# Patient Record
Sex: FEMALE | NOT HISPANIC OR LATINO | Employment: OTHER | URBAN - METROPOLITAN AREA
[De-identification: names, ages, dates, MRNs, and addresses within clinical notes are randomized per-mention and may not be internally consistent; named-entity substitution may affect disease eponyms.]

---

## 2023-04-28 LAB
ALANINE AMINOTRANSFERASE (SGPT) (U/L) IN SER/PLAS: 15 U/L (ref 7–45)
ALBUMIN (G/DL) IN SER/PLAS: 4.5 G/DL (ref 3.4–5)
ALKALINE PHOSPHATASE (U/L) IN SER/PLAS: 58 U/L (ref 33–136)
ANION GAP IN SER/PLAS: 15 MMOL/L (ref 10–20)
ASPARTATE AMINOTRANSFERASE (SGOT) (U/L) IN SER/PLAS: 20 U/L (ref 9–39)
BILIRUBIN TOTAL (MG/DL) IN SER/PLAS: 0.5 MG/DL (ref 0–1.2)
CALCIUM (MG/DL) IN SER/PLAS: 9.3 MG/DL (ref 8.6–10.3)
CARBON DIOXIDE, TOTAL (MMOL/L) IN SER/PLAS: 25 MMOL/L (ref 21–32)
CHLORIDE (MMOL/L) IN SER/PLAS: 103 MMOL/L (ref 98–107)
CHOLESTEROL (MG/DL) IN SER/PLAS: 153 MG/DL (ref 0–199)
CHOLESTEROL IN HDL (MG/DL) IN SER/PLAS: 48.2 MG/DL
CHOLESTEROL/HDL RATIO: 3.2
CREATININE (MG/DL) IN SER/PLAS: 0.76 MG/DL (ref 0.5–1.05)
ERYTHROCYTE DISTRIBUTION WIDTH (RATIO) BY AUTOMATED COUNT: 15.9 % (ref 11.5–14.5)
ERYTHROCYTE MEAN CORPUSCULAR HEMOGLOBIN CONCENTRATION (G/DL) BY AUTOMATED: 30.1 G/DL (ref 32–36)
ERYTHROCYTE MEAN CORPUSCULAR VOLUME (FL) BY AUTOMATED COUNT: 94 FL (ref 80–100)
ERYTHROCYTES (10*6/UL) IN BLOOD BY AUTOMATED COUNT: 5.33 X10E12/L (ref 4–5.2)
GFR FEMALE: 82 ML/MIN/1.73M2
GLUCOSE (MG/DL) IN SER/PLAS: 93 MG/DL (ref 74–99)
HEMATOCRIT (%) IN BLOOD BY AUTOMATED COUNT: 50.1 % (ref 36–46)
HEMOGLOBIN (G/DL) IN BLOOD: 15.1 G/DL (ref 12–16)
LDL: 86 MG/DL (ref 0–99)
LEUKOCYTES (10*3/UL) IN BLOOD BY AUTOMATED COUNT: 7.7 X10E9/L (ref 4.4–11.3)
PLATELETS (10*3/UL) IN BLOOD AUTOMATED COUNT: 253 X10E9/L (ref 150–450)
POTASSIUM (MMOL/L) IN SER/PLAS: 4.4 MMOL/L (ref 3.5–5.3)
PROTEIN TOTAL: 6.5 G/DL (ref 6.4–8.2)
SODIUM (MMOL/L) IN SER/PLAS: 139 MMOL/L (ref 136–145)
THYROTROPIN (MIU/L) IN SER/PLAS BY DETECTION LIMIT <= 0.05 MIU/L: 1.88 MIU/L (ref 0.44–3.98)
TRIGLYCERIDE (MG/DL) IN SER/PLAS: 96 MG/DL (ref 0–149)
UREA NITROGEN (MG/DL) IN SER/PLAS: 25 MG/DL (ref 6–23)
VLDL: 19 MG/DL (ref 0–40)

## 2023-04-29 LAB
ESTIMATED AVERAGE GLUCOSE FOR HBA1C: 151 MG/DL
HEMOGLOBIN A1C/HEMOGLOBIN TOTAL IN BLOOD: 6.9 %

## 2023-07-17 LAB
CREATININE (MG/DL) IN SER/PLAS: 0.76 MG/DL (ref 0.5–1.05)
GFR FEMALE: 82 ML/MIN/1.73M2
UREA NITROGEN (MG/DL) IN SER/PLAS: 24 MG/DL (ref 6–23)

## 2023-10-09 ENCOUNTER — TELEPHONE (OUTPATIENT)
Dept: PRIMARY CARE | Facility: CLINIC | Age: 73
End: 2023-10-09
Payer: MEDICARE

## 2023-10-09 DIAGNOSIS — I15.9 SECONDARY HYPERTENSION: Primary | ICD-10-CM

## 2023-10-09 RX ORDER — ATENOLOL 25 MG/1
25 TABLET ORAL DAILY
Qty: 90 TABLET | Refills: 1 | Status: CANCELLED | OUTPATIENT
Start: 2023-10-09 | End: 2024-04-06

## 2023-10-10 ENCOUNTER — TELEPHONE (OUTPATIENT)
Dept: PRIMARY CARE | Facility: CLINIC | Age: 73
End: 2023-10-10
Payer: MEDICARE

## 2023-10-10 DIAGNOSIS — I10 HYPERTENSION, UNSPECIFIED TYPE: Primary | ICD-10-CM

## 2023-10-10 RX ORDER — ATENOLOL 25 MG/1
25 TABLET ORAL DAILY
Qty: 90 TABLET | Refills: 1 | Status: SHIPPED | OUTPATIENT
Start: 2023-10-10 | End: 2024-04-04

## 2023-10-12 ENCOUNTER — TELEPHONE (OUTPATIENT)
Dept: PRIMARY CARE | Facility: CLINIC | Age: 73
End: 2023-10-12
Payer: MEDICARE

## 2023-10-12 DIAGNOSIS — E78.5 HYPERLIPIDEMIA, UNSPECIFIED HYPERLIPIDEMIA TYPE: Primary | ICD-10-CM

## 2023-10-12 RX ORDER — PRAVASTATIN SODIUM 20 MG/1
20 TABLET ORAL DAILY
Qty: 90 TABLET | Refills: 3 | Status: SHIPPED | OUTPATIENT
Start: 2023-10-12 | End: 2024-10-11

## 2023-10-13 RX ORDER — ATENOLOL 25 MG/1
25 TABLET ORAL DAILY
Qty: 90 TABLET | Refills: 3 | Status: SHIPPED | OUTPATIENT
Start: 2023-10-13 | End: 2023-10-23 | Stop reason: ALTCHOICE

## 2023-10-23 ENCOUNTER — OFFICE VISIT (OUTPATIENT)
Dept: PRIMARY CARE | Facility: CLINIC | Age: 73
End: 2023-10-23
Payer: MEDICARE

## 2023-10-23 VITALS
DIASTOLIC BLOOD PRESSURE: 76 MMHG | TEMPERATURE: 97.8 F | OXYGEN SATURATION: 96 % | SYSTOLIC BLOOD PRESSURE: 128 MMHG | BODY MASS INDEX: 28.53 KG/M2 | HEART RATE: 68 BPM | WEIGHT: 156 LBS

## 2023-10-23 DIAGNOSIS — M79.672 LEFT FOOT PAIN: Primary | ICD-10-CM

## 2023-10-23 DIAGNOSIS — I83.812 VARICOSE VEINS OF LEFT LOWER EXTREMITY WITH PAIN: ICD-10-CM

## 2023-10-23 PROBLEM — M85.80 OSTEOPENIA: Status: ACTIVE | Noted: 2023-10-23

## 2023-10-23 PROBLEM — M47.27 LUMBOSACRAL RADICULOPATHY DUE TO DEGENERATIVE JOINT DISEASE OF SPINE: Status: ACTIVE | Noted: 2023-10-23

## 2023-10-23 PROBLEM — N60.19 FIBROCYSTIC BREAST DISEASE: Status: ACTIVE | Noted: 2023-10-23

## 2023-10-23 PROBLEM — E04.1 THYROID NODULE: Status: ACTIVE | Noted: 2023-10-23

## 2023-10-23 PROBLEM — M54.30 SCIATICA: Status: ACTIVE | Noted: 2023-10-23

## 2023-10-23 PROBLEM — M54.16 CHRONIC RADICULAR LUMBAR PAIN: Status: ACTIVE | Noted: 2023-10-23

## 2023-10-23 PROBLEM — M79.18 MYOFASCIAL PAIN: Status: ACTIVE | Noted: 2023-10-23

## 2023-10-23 PROBLEM — I73.9 PERIPHERAL VASCULAR DISEASE (CMS-HCC): Status: ACTIVE | Noted: 2023-10-23

## 2023-10-23 PROBLEM — M54.16 LUMBAR RADICULITIS: Status: ACTIVE | Noted: 2023-10-23

## 2023-10-23 PROBLEM — G89.29 CHRONIC RADICULAR LUMBAR PAIN: Status: ACTIVE | Noted: 2023-10-23

## 2023-10-23 PROBLEM — M96.1 LUMBAR POST-LAMINECTOMY SYNDROME: Status: ACTIVE | Noted: 2023-10-23

## 2023-10-23 PROCEDURE — 99214 OFFICE O/P EST MOD 30 MIN: CPT | Performed by: STUDENT IN AN ORGANIZED HEALTH CARE EDUCATION/TRAINING PROGRAM

## 2023-10-23 PROCEDURE — 1159F MED LIST DOCD IN RCRD: CPT | Performed by: STUDENT IN AN ORGANIZED HEALTH CARE EDUCATION/TRAINING PROGRAM

## 2023-10-23 RX ORDER — PIOGLITAZONEHYDROCHLORIDE 30 MG/1
30 TABLET ORAL DAILY
COMMUNITY
End: 2024-02-06 | Stop reason: SDUPTHER

## 2023-10-23 RX ORDER — FENOFIBRATE 160 MG/1
160 TABLET ORAL DAILY
COMMUNITY
End: 2023-11-14

## 2023-10-23 RX ORDER — LISINOPRIL 20 MG/1
20 TABLET ORAL DAILY
COMMUNITY
End: 2023-12-19

## 2023-10-23 NOTE — PROGRESS NOTES
Subjective   Patient ID: Yuliet Graandos is a 73 y.o. female who presents for Foot Injury (LEFT FOOT).  Today she is accompanied by alone.     HPI  L foot pain   That started 3 weeks ago.  Pain is on the dorsal aspect of the left foot.  She also complains of left calf pain.  Patient states the pain started when she was getting out of a car.  Endorses no history of trauma.  She endorses a spinal block 1 month ago for bulging L4 disc.  On physical exam there is no redness, swelling, or bruising.  Calves are symmetrical.  No signs of DVT.  Patient states the pain is 1 out of 10 today.  Recent lab work including CMP were within normal limits.     Current Outpatient Medications on File Prior to Visit   Medication Sig Dispense Refill    atenolol (Tenormin) 25 mg tablet Take 1 tablet (25 mg) by mouth once daily. 90 tablet 1    pravastatin (Pravachol) 20 mg tablet Take 1 tablet (20 mg) by mouth once daily. 90 tablet 3    fenofibrate (Triglide) 160 mg tablet Take 1 tablet (160 mg) by mouth once daily.      lisinopril 20 mg tablet Take 1 tablet (20 mg) by mouth once daily.      pioglitazone (Actos) 30 mg tablet Take 1 tablet (30 mg) by mouth once daily.      [DISCONTINUED] atenolol (Tenormin) 25 mg tablet Take 1 tablet (25 mg) by mouth once daily. (Patient not taking: Reported on 10/23/2023) 90 tablet 3     No current facility-administered medications on file prior to visit.        Allergies   Allergen Reactions    Amoxicillin Nausea And Vomiting    Amoxicillin-Pot Clavulanate Nausea And Vomiting    Doxycycline Nausea And Vomiting    Meperidine Hallucinations         There is no immunization history on file for this patient.      Review of Systems  All pertinent positive symptoms are included in the history of present illness.  All other systems have been reviewed and are negative and noncontributory to this patient's current ailments.     Objective   /76   Pulse 68   Temp 36.6 °C (97.8 °F)   Wt 70.8 kg (156 lb)    SpO2 96%   BMI 28.53 kg/m²   BSA: 1.76 meters squared  No visits with results within 1 Month(s) from this visit.   Latest known visit with results is:   Orders Only on 07/17/2023   Component Date Value Ref Range Status    Urea Nitrogen 07/17/2023 24 (H)  6 - 23 mg/dL Final    Creatinine 07/17/2023 0.76  0.50 - 1.05 mg/dL Final    GFR Female 07/17/2023 82  >90 mL/min/1.73m2 Final    Comment:  CALCULATIONS OF ESTIMATED GFR ARE PERFORMED   USING THE 2021 CKD-EPI STUDY REFIT EQUATION   WITHOUT THE RACE VARIABLE FOR THE IDMS-TRACEABLE   CREATININE METHODS.    https://jasn.asnjournals.org/content/early/2021/09/22/ASN.7154056957         Physical Exam  CONSTITUTIONAL - well nourished, well developed, looks like stated age, in no acute distress, not ill-appearing, and not tired appearing  SKIN - normal skin color and pigmentation, normal skin turgor without rash, lesions, or nodules visualized  HEAD - no trauma, normocephalic  EYES - normal external exam  CHEST -no distressed breathing, good effort  EXTREMITIES - no edema, no deformities  NEUROLOGICAL - normal balance, normal motor, no ataxia  PSYCHIATRIC - alert, pleasant and cordial, age-appropriate    Assessment/Plan       L foot pain  Patient denied need for x-ray at this time.  A handout on exercises for Planter fasciitis was given.  Requisition for physical therapy was also placed.  Please follow-up with them at your convenience.  We also discussed the use of heat, ice, and stretching to avoid future exacerbations.

## 2023-11-14 DIAGNOSIS — E78.5 HYPERLIPIDEMIA, UNSPECIFIED HYPERLIPIDEMIA TYPE: Primary | ICD-10-CM

## 2023-11-14 RX ORDER — FENOFIBRATE 160 MG/1
160 TABLET ORAL DAILY
Qty: 90 TABLET | Refills: 0 | Status: SHIPPED | OUTPATIENT
Start: 2023-11-14 | End: 2024-02-13

## 2023-11-15 ENCOUNTER — TELEPHONE (OUTPATIENT)
Dept: PRIMARY CARE | Facility: CLINIC | Age: 73
End: 2023-11-15
Payer: MEDICARE

## 2023-11-15 DIAGNOSIS — R73.03 PRE-DIABETES: Primary | ICD-10-CM

## 2023-11-15 NOTE — TELEPHONE ENCOUNTER
Needs a lab order. To include A1C for her diabetes.  Going to     A1C was sent but patient needs all of her labs done.

## 2023-11-15 NOTE — PROGRESS NOTES
A1c added to lab order.    [facility]    Please give pt a call to schedule. Thank you    Next Appointment:  2 Months     Tests:      Please have labs and x-rays that were ordered performed.     Recommend having labs (and x-rays if ordered) performed at a [facility] facility, as results are then automatically uploaded into our in-basket system.  If having labs performed at a [place] facility then patient should contact/notify us soon thereafter, so we can actively retrieve results from [employer] system.

## 2023-11-17 ENCOUNTER — LAB (OUTPATIENT)
Dept: LAB | Facility: LAB | Age: 73
End: 2023-11-17
Payer: MEDICARE

## 2023-11-17 DIAGNOSIS — R73.03 PRE-DIABETES: ICD-10-CM

## 2023-11-17 DIAGNOSIS — I83.812 VARICOSE VEINS OF LEFT LOWER EXTREMITY WITH PAIN: ICD-10-CM

## 2023-11-17 LAB
ALBUMIN SERPL BCP-MCNC: 4.2 G/DL (ref 3.4–5)
ALP SERPL-CCNC: 47 U/L (ref 33–136)
ALT SERPL W P-5'-P-CCNC: 12 U/L (ref 7–45)
ANION GAP SERPL CALC-SCNC: 16 MMOL/L (ref 10–20)
AST SERPL W P-5'-P-CCNC: 17 U/L (ref 9–39)
BILIRUB SERPL-MCNC: 0.4 MG/DL (ref 0–1.2)
BUN SERPL-MCNC: 22 MG/DL (ref 6–23)
CALCIUM SERPL-MCNC: 9.5 MG/DL (ref 8.6–10.3)
CHLORIDE SERPL-SCNC: 104 MMOL/L (ref 98–107)
CO2 SERPL-SCNC: 24 MMOL/L (ref 21–32)
CREAT SERPL-MCNC: 0.74 MG/DL (ref 0.5–1.05)
EST. AVERAGE GLUCOSE BLD GHB EST-MCNC: 140 MG/DL
GFR SERPL CREATININE-BSD FRML MDRD: 84 ML/MIN/1.73M*2
GLUCOSE SERPL-MCNC: 110 MG/DL (ref 74–99)
HBA1C MFR BLD: 6.5 %
MAGNESIUM SERPL-MCNC: 2.04 MG/DL (ref 1.6–2.4)
POTASSIUM SERPL-SCNC: 4.9 MMOL/L (ref 3.5–5.3)
PROT SERPL-MCNC: 6.4 G/DL (ref 6.4–8.2)
SODIUM SERPL-SCNC: 139 MMOL/L (ref 136–145)

## 2023-11-17 PROCEDURE — 83036 HEMOGLOBIN GLYCOSYLATED A1C: CPT

## 2023-11-17 PROCEDURE — 36415 COLL VENOUS BLD VENIPUNCTURE: CPT

## 2023-11-17 PROCEDURE — 83735 ASSAY OF MAGNESIUM: CPT

## 2023-11-17 PROCEDURE — 80053 COMPREHEN METABOLIC PANEL: CPT

## 2023-12-19 DIAGNOSIS — I10 HYPERTENSION, UNSPECIFIED TYPE: Primary | ICD-10-CM

## 2023-12-19 RX ORDER — LISINOPRIL 20 MG/1
20 TABLET ORAL DAILY
Qty: 90 TABLET | Refills: 2 | Status: SHIPPED | OUTPATIENT
Start: 2023-12-19

## 2024-02-06 DIAGNOSIS — E11.9 TYPE 2 DIABETES MELLITUS WITHOUT COMPLICATION, WITHOUT LONG-TERM CURRENT USE OF INSULIN (MULTI): Primary | ICD-10-CM

## 2024-02-06 DIAGNOSIS — Z12.31 ENCOUNTER FOR SCREENING MAMMOGRAM FOR BREAST CANCER: Primary | ICD-10-CM

## 2024-02-06 RX ORDER — PIOGLITAZONEHYDROCHLORIDE 30 MG/1
30 TABLET ORAL DAILY
Qty: 90 TABLET | Refills: 1 | Status: SHIPPED | OUTPATIENT
Start: 2024-02-06 | End: 2024-08-04

## 2024-02-06 NOTE — TELEPHONE ENCOUNTER
Patient is requesting refill   To [facility]  Pioglitazone 30 MG  #90  Please make sure this medication goes to

## 2024-02-12 ENCOUNTER — HOSPITAL ENCOUNTER (OUTPATIENT)
Dept: RADIOLOGY | Facility: HOSPITAL | Age: 74
Discharge: HOME | End: 2024-02-12
Payer: MEDICARE

## 2024-02-12 DIAGNOSIS — Z12.31 ENCOUNTER FOR SCREENING MAMMOGRAM FOR BREAST CANCER: ICD-10-CM

## 2024-02-12 PROCEDURE — 77063 BREAST TOMOSYNTHESIS BI: CPT | Performed by: RADIOLOGY

## 2024-02-12 PROCEDURE — 77067 SCR MAMMO BI INCL CAD: CPT | Performed by: RADIOLOGY

## 2024-02-12 PROCEDURE — 77067 SCR MAMMO BI INCL CAD: CPT

## 2024-02-13 DIAGNOSIS — E78.5 HYPERLIPIDEMIA, UNSPECIFIED HYPERLIPIDEMIA TYPE: ICD-10-CM

## 2024-02-13 RX ORDER — FENOFIBRATE 160 MG/1
160 TABLET ORAL DAILY
Qty: 30 TABLET | Refills: 0 | Status: SHIPPED | OUTPATIENT
Start: 2024-02-13 | End: 2024-02-15 | Stop reason: SDUPTHER

## 2024-02-15 DIAGNOSIS — E78.5 HYPERLIPIDEMIA, UNSPECIFIED HYPERLIPIDEMIA TYPE: ICD-10-CM

## 2024-02-15 RX ORDER — FENOFIBRATE 160 MG/1
160 TABLET ORAL DAILY
Qty: 90 TABLET | Refills: 2 | Status: SHIPPED | OUTPATIENT
Start: 2024-02-15

## 2024-02-15 NOTE — TELEPHONE ENCOUNTER
Patient Fenofibrate 160mg  takes one a day.  Script was sent in for #30  with no refill.  Pharmacy [facility]  Can she get #90 with refills.

## 2024-02-22 ENCOUNTER — APPOINTMENT (OUTPATIENT)
Dept: PRIMARY CARE | Facility: CLINIC | Age: 74
End: 2024-02-22
Payer: MEDICARE

## 2024-02-22 ENCOUNTER — OFFICE VISIT (OUTPATIENT)
Dept: PRIMARY CARE | Facility: CLINIC | Age: 74
End: 2024-02-22
Payer: MEDICARE

## 2024-02-22 VITALS
WEIGHT: 157 LBS | TEMPERATURE: 97.4 F | HEART RATE: 82 BPM | HEIGHT: 62 IN | BODY MASS INDEX: 28.89 KG/M2 | OXYGEN SATURATION: 96 %

## 2024-02-22 DIAGNOSIS — M25.812 IMPINGEMENT OF LEFT SHOULDER: Primary | ICD-10-CM

## 2024-02-22 PROCEDURE — 99214 OFFICE O/P EST MOD 30 MIN: CPT | Performed by: FAMILY MEDICINE

## 2024-02-22 PROCEDURE — 1125F AMNT PAIN NOTED PAIN PRSNT: CPT | Performed by: FAMILY MEDICINE

## 2024-02-22 PROCEDURE — 20610 DRAIN/INJ JOINT/BURSA W/O US: CPT

## 2024-02-22 PROCEDURE — 1159F MED LIST DOCD IN RCRD: CPT | Performed by: FAMILY MEDICINE

## 2024-02-22 RX ORDER — LIDOCAINE HYDROCHLORIDE 10 MG/ML
2 INJECTION INFILTRATION; PERINEURAL
Status: COMPLETED | OUTPATIENT
Start: 2024-02-22 | End: 2024-02-22

## 2024-02-22 RX ORDER — TRIAMCINOLONE ACETONIDE 40 MG/ML
40 INJECTION, SUSPENSION INTRA-ARTICULAR; INTRAMUSCULAR ONCE
Status: DISCONTINUED | OUTPATIENT
Start: 2024-02-22 | End: 2024-02-22

## 2024-02-22 RX ORDER — TRIAMCINOLONE ACETONIDE 40 MG/ML
40 INJECTION, SUSPENSION INTRA-ARTICULAR; INTRAMUSCULAR
Status: COMPLETED | OUTPATIENT
Start: 2024-02-22 | End: 2024-02-22

## 2024-02-22 RX ADMIN — LIDOCAINE HYDROCHLORIDE 2 ML: 10 INJECTION INFILTRATION; PERINEURAL at 13:48

## 2024-02-22 RX ADMIN — TRIAMCINOLONE ACETONIDE 40 MG: 40 INJECTION, SUSPENSION INTRA-ARTICULAR; INTRAMUSCULAR at 13:48

## 2024-02-22 ASSESSMENT — PAIN SCALES - GENERAL: PAINLEVEL: 4

## 2024-02-22 NOTE — PROGRESS NOTES
"Subjective   Patient ID: Yluiet Granados is a 73 y.o. female who presents for left shoulder pain (Left shoulder pain goes down into bicep would like an injection).    -Left shoulder pain for years   -Last cortisone injection lasted for 18mo-2 years, has been hurting for a few weeks now  -Estimates that she's had about 4 cortisone injections for the left shoulder  -Right hand dominant  -Difficulty with Abduction    Denies new onset headaches, fever, chills, n/v/d, chest pain, SOB, abdominal pain, urinary symptoms, and lower extremity edema.     Review of Systems  All other systems have been reviewed and are negative.    Visit Vitals  Pulse 82   Temp 36.3 °C (97.4 °F)   Ht 1.575 m (5' 2\")   Wt 71.2 kg (157 lb)   SpO2 96%   BMI 28.72 kg/m²   OB Status Postmenopausal   Smoking Status Every Day   BSA 1.76 m²       Objective   Physical Exam  General: Alert and oriented. Appears well-nourished and in no acute distress.  Head/neck: Normocephalic. Atraumatic.  Respiratory: Normal effort    Musculoskeletal:   Left shoulder-Pain with abduction, internal rotation to T10, full ROM forward flexion. Positive Shakir's, positive hawkin's. Tenderness to palpation along superior aspect of left shoulder.     Psychological: Appropriate mood and affect.   Skin: No visible rashes or lesions.     Assessment/Plan   Yuliet is a 73 year old female who presents with left shoulder pain.     Problem List Items Addressed This Visit       Impingement of left shoulder    -  Primary  -Patient likely has some component of arthritis and shoulder impingement due to physical exam  -Patient has had corticosteroid injection in L shoulder and is requesting one today  -Kenalog injection given       Joint Injection Large/Arthrocentesis: L subacromial bursa on 2/22/2024 1:48 PM  Indications: pain  Details: 22 G needle, posterior approach  Medications: 40 mg triamcinolone acetonide 40 mg/mL; 2 mL lidocaine 10 mg/mL (1 %)  Outcome: tolerated well, no immediate " complications  Procedure, treatment alternatives, risks and benefits explained, specific risks discussed. Consent was given by the patient. Immediately prior to procedure a time out was called to verify the correct patient, procedure, equipment, support staff and site/side marked as required. Patient was prepped and draped in the usual sterile fashion.       No red flags. All questions and concerns were addressed. Patient verbalizes understanding instructions and agrees with established plan of care.     Patient was seen  and discussed with attending physician Dr. Lopez.  Christy Lino,   Family Medicine, PGY-2

## 2024-02-22 NOTE — PROGRESS NOTES
I reviewed and examined the patient. I was present for the key exam elements, and I fully participated in the patient's care. I discussed the management of the care with the resident. I have personally reviewed the pertinent labs and imaging, as well as recent notes, with the patient. I have reviewed the note above and agree with the resident's medical decision making as documented in the resident's note, in addition to the following comments / findings:     Agree with the rest of the plan outlined below by resident physician. No red flags.      The patient understands and agrees to the assessment and plan of care. Patient has also agreed to follow up and comply with the treatment and evaluation as recommended today. Patient was instructed to call the office at [phone] should questions arise regarding their treatment or care.     Ko Lopez DO, FAOASM  Family Medicine   [address], [address]  [place] [address]     Ko Lopez DO

## 2024-02-29 ENCOUNTER — OFFICE VISIT (OUTPATIENT)
Dept: SURGERY | Facility: CLINIC | Age: 74
End: 2024-02-29
Payer: MEDICARE

## 2024-02-29 VITALS
BODY MASS INDEX: 28.34 KG/M2 | HEART RATE: 53 BPM | HEIGHT: 62 IN | TEMPERATURE: 96.8 F | SYSTOLIC BLOOD PRESSURE: 126 MMHG | DIASTOLIC BLOOD PRESSURE: 61 MMHG | OXYGEN SATURATION: 99 % | WEIGHT: 154 LBS

## 2024-02-29 DIAGNOSIS — Z12.39 ENCOUNTER FOR SCREENING BREAST EXAMINATION: Primary | ICD-10-CM

## 2024-02-29 PROCEDURE — 1126F AMNT PAIN NOTED NONE PRSNT: CPT | Performed by: SURGERY

## 2024-02-29 PROCEDURE — 99213 OFFICE O/P EST LOW 20 MIN: CPT | Performed by: SURGERY

## 2024-02-29 PROCEDURE — 1159F MED LIST DOCD IN RCRD: CPT | Performed by: SURGERY

## 2024-02-29 ASSESSMENT — PATIENT HEALTH QUESTIONNAIRE - PHQ9
2. FEELING DOWN, DEPRESSED OR HOPELESS: NOT AT ALL
1. LITTLE INTEREST OR PLEASURE IN DOING THINGS: NOT AT ALL
SUM OF ALL RESPONSES TO PHQ9 QUESTIONS 1 AND 2: 0

## 2024-02-29 ASSESSMENT — PAIN SCALES - GENERAL: PAINLEVEL: 0-NO PAIN

## 2024-02-29 ASSESSMENT — ENCOUNTER SYMPTOMS
OCCASIONAL FEELINGS OF UNSTEADINESS: 0
LOSS OF SENSATION IN FEET: 0
DEPRESSION: 0

## 2024-03-01 NOTE — PROGRESS NOTES
Subjective   Patient ID: Yuliet Granados is a 73 y.o. female who presents for Follow-up (EST Annual Breast Exam).  HPI  This is a very pleasant woman that we see on a yearly basis for her breast exam.  The patient has fully retired that is fairly recent news for her.  She feels well and has no other pertinent complaints.  She does have some arthritis issues but that is her only health concern.  Her recent mammogram was unchanged.  Review of Systems  10 point review is otherwise negative  Objective   Physical Exam head is normocephalic atraumatic eyes extraocular movements are intact pupils are equal and round the breasts are symmetric there are no skin changes no nipple discharge no retraction.  There are no palpable masses in either breast.  Supraclavicular fossae and the axillae are both negative.  Extremities do not reveal any gross deformities.  The mammogram was reviewed and it is unchanged.    Assessment/Plan continue self breast exam follow-up mammogram in 1 year.  Clinical breast exam after.           Aletha Estrada MD 02/29/24 8:11 PM

## 2024-04-04 DIAGNOSIS — I10 HYPERTENSION, UNSPECIFIED TYPE: ICD-10-CM

## 2024-04-04 RX ORDER — ATENOLOL 25 MG/1
25 TABLET ORAL DAILY
Qty: 90 TABLET | Refills: 3 | Status: SHIPPED | OUTPATIENT
Start: 2024-04-04

## 2024-04-09 ENCOUNTER — TELEPHONE (OUTPATIENT)
Dept: PRIMARY CARE | Facility: CLINIC | Age: 74
End: 2024-04-09
Payer: MEDICARE

## 2024-04-09 DIAGNOSIS — E78.5 HYPERLIPIDEMIA, UNSPECIFIED HYPERLIPIDEMIA TYPE: ICD-10-CM

## 2024-04-09 DIAGNOSIS — R53.83 FATIGUE, UNSPECIFIED TYPE: ICD-10-CM

## 2024-04-09 DIAGNOSIS — R73.03 PRE-DIABETES: Primary | ICD-10-CM

## 2024-04-09 NOTE — TELEPHONE ENCOUNTER
Can she get lab order for 6 month check that would include A1C. Patient scheduled for may for bruising just in case different labs are needed.

## 2024-04-11 ENCOUNTER — LAB (OUTPATIENT)
Dept: LAB | Facility: LAB | Age: 74
End: 2024-04-11
Payer: MEDICARE

## 2024-04-11 DIAGNOSIS — E78.5 HYPERLIPIDEMIA, UNSPECIFIED HYPERLIPIDEMIA TYPE: ICD-10-CM

## 2024-04-11 DIAGNOSIS — R53.83 FATIGUE, UNSPECIFIED TYPE: ICD-10-CM

## 2024-04-11 DIAGNOSIS — R73.03 PRE-DIABETES: ICD-10-CM

## 2024-04-11 LAB
ALBUMIN SERPL BCP-MCNC: 4.6 G/DL (ref 3.4–5)
ALP SERPL-CCNC: 49 U/L (ref 33–136)
ALT SERPL W P-5'-P-CCNC: 18 U/L (ref 7–45)
ANION GAP SERPL CALC-SCNC: 18 MMOL/L (ref 10–20)
AST SERPL W P-5'-P-CCNC: 18 U/L (ref 9–39)
BASOPHILS # BLD AUTO: 0.1 X10*3/UL (ref 0–0.1)
BASOPHILS NFR BLD AUTO: 1.1 %
BILIRUB SERPL-MCNC: 0.5 MG/DL (ref 0–1.2)
BUN SERPL-MCNC: 28 MG/DL (ref 6–23)
CALCIUM SERPL-MCNC: 10.1 MG/DL (ref 8.6–10.3)
CHLORIDE SERPL-SCNC: 100 MMOL/L (ref 98–107)
CHOLEST SERPL-MCNC: 201 MG/DL (ref 0–199)
CHOLESTEROL/HDL RATIO: 3.2
CO2 SERPL-SCNC: 27 MMOL/L (ref 21–32)
CREAT SERPL-MCNC: 0.81 MG/DL (ref 0.5–1.05)
EGFRCR SERPLBLD CKD-EPI 2021: 76 ML/MIN/1.73M*2
EOSINOPHIL # BLD AUTO: 0.15 X10*3/UL (ref 0–0.4)
EOSINOPHIL NFR BLD AUTO: 1.7 %
ERYTHROCYTE [DISTWIDTH] IN BLOOD BY AUTOMATED COUNT: 16.4 % (ref 11.5–14.5)
EST. AVERAGE GLUCOSE BLD GHB EST-MCNC: 148 MG/DL
GLUCOSE SERPL-MCNC: 102 MG/DL (ref 74–99)
HBA1C MFR BLD: 6.8 %
HCT VFR BLD AUTO: 51.2 % (ref 36–46)
HDLC SERPL-MCNC: 63.4 MG/DL
HGB BLD-MCNC: 16.4 G/DL (ref 12–16)
IMM GRANULOCYTES # BLD AUTO: 0.05 X10*3/UL (ref 0–0.5)
IMM GRANULOCYTES NFR BLD AUTO: 0.6 % (ref 0–0.9)
LDLC SERPL CALC-MCNC: 116 MG/DL
LYMPHOCYTES # BLD AUTO: 2.3 X10*3/UL (ref 0.8–3)
LYMPHOCYTES NFR BLD AUTO: 26 %
MCH RBC QN AUTO: 29.1 PG (ref 26–34)
MCHC RBC AUTO-ENTMCNC: 32 G/DL (ref 32–36)
MCV RBC AUTO: 91 FL (ref 80–100)
MONOCYTES # BLD AUTO: 0.73 X10*3/UL (ref 0.05–0.8)
MONOCYTES NFR BLD AUTO: 8.2 %
NEUTROPHILS # BLD AUTO: 5.53 X10*3/UL (ref 1.6–5.5)
NEUTROPHILS NFR BLD AUTO: 62.4 %
NON HDL CHOLESTEROL: 138 MG/DL (ref 0–149)
NRBC BLD-RTO: 0 /100 WBCS (ref 0–0)
PLATELET # BLD AUTO: 330 X10*3/UL (ref 150–450)
POTASSIUM SERPL-SCNC: 4.7 MMOL/L (ref 3.5–5.3)
PROT SERPL-MCNC: 7.1 G/DL (ref 6.4–8.2)
RBC # BLD AUTO: 5.64 X10*6/UL (ref 4–5.2)
SODIUM SERPL-SCNC: 140 MMOL/L (ref 136–145)
TRIGL SERPL-MCNC: 110 MG/DL (ref 0–149)
VLDL: 22 MG/DL (ref 0–40)
WBC # BLD AUTO: 8.9 X10*3/UL (ref 4.4–11.3)

## 2024-04-11 PROCEDURE — 83036 HEMOGLOBIN GLYCOSYLATED A1C: CPT

## 2024-04-11 PROCEDURE — 36415 COLL VENOUS BLD VENIPUNCTURE: CPT

## 2024-04-11 PROCEDURE — 80053 COMPREHEN METABOLIC PANEL: CPT

## 2024-04-11 PROCEDURE — 80061 LIPID PANEL: CPT

## 2024-04-11 PROCEDURE — 85025 COMPLETE CBC W/AUTO DIFF WBC: CPT

## 2024-05-09 ENCOUNTER — OFFICE VISIT (OUTPATIENT)
Dept: PRIMARY CARE | Facility: CLINIC | Age: 74
End: 2024-05-09
Payer: MEDICARE

## 2024-05-09 VITALS
HEART RATE: 59 BPM | SYSTOLIC BLOOD PRESSURE: 157 MMHG | BODY MASS INDEX: 29.26 KG/M2 | WEIGHT: 159 LBS | OXYGEN SATURATION: 97 % | HEIGHT: 62 IN | DIASTOLIC BLOOD PRESSURE: 67 MMHG | TEMPERATURE: 97.4 F

## 2024-05-09 DIAGNOSIS — R71.8 ELEVATED HEMATOCRIT: ICD-10-CM

## 2024-05-09 DIAGNOSIS — F17.200 TOBACCO USE DISORDER: ICD-10-CM

## 2024-05-09 DIAGNOSIS — D58.2 ELEVATED HEMOGLOBIN (CMS-HCC): ICD-10-CM

## 2024-05-09 DIAGNOSIS — L53.9 ERYTHEMA OF HAND: Primary | ICD-10-CM

## 2024-05-09 PROCEDURE — 1159F MED LIST DOCD IN RCRD: CPT | Performed by: FAMILY MEDICINE

## 2024-05-09 PROCEDURE — 1158F ADVNC CARE PLAN TLK DOCD: CPT | Performed by: FAMILY MEDICINE

## 2024-05-09 PROCEDURE — 1126F AMNT PAIN NOTED NONE PRSNT: CPT | Performed by: FAMILY MEDICINE

## 2024-05-09 PROCEDURE — 1123F ACP DISCUSS/DSCN MKR DOCD: CPT | Performed by: FAMILY MEDICINE

## 2024-05-09 PROCEDURE — 99213 OFFICE O/P EST LOW 20 MIN: CPT | Performed by: FAMILY MEDICINE

## 2024-05-09 ASSESSMENT — PATIENT HEALTH QUESTIONNAIRE - PHQ9
1. LITTLE INTEREST OR PLEASURE IN DOING THINGS: NOT AT ALL
2. FEELING DOWN, DEPRESSED OR HOPELESS: NOT AT ALL
SUM OF ALL RESPONSES TO PHQ9 QUESTIONS 1 AND 2: 0

## 2024-05-09 ASSESSMENT — PAIN SCALES - GENERAL: PAINLEVEL: 0-NO PAIN

## 2024-05-09 NOTE — PROGRESS NOTES
"Chief Complaint Yuliet Granados is a 74 y.o. female who presents for \"bruising\" of the hands    HPI:  Pt states that her hands and toes often get red. Denies any thing making it worse such as a shower or hot tub. Does complain of some itching in that area. Denies any rashes. Denies the cold making redness worse. Denies any history of blood clots. States that her mother had something similar her whole life. Endorses that she does smoke a quarter pack per day.  Has previously seen a hematologist oncologist in the past for elevated white blood cells and hemoglobin but they said everything was fine and suggested to quit smoking. Denies any other complaints or concerns. ROS reviewed below.       ROS:  Review of Systems   Constitutional:  Negative for chills, fatigue and fever.   HENT:  Negative for nosebleeds.    Eyes:  Negative for visual disturbance.   Gastrointestinal:  Negative for blood in stool.   Genitourinary:  Negative for hematuria and vaginal bleeding.   Musculoskeletal:  Negative for arthralgias, joint swelling and myalgias.   Skin:  Positive for color change.        Erythema of bilateral hands   Neurological:  Negative for dizziness, syncope, light-headedness and headaches.   Hematological:  Does not bruise/bleed easily.       Meds:    Current Outpatient Medications:     atenolol (Tenormin) 25 mg tablet, TAKE ONE TABLET BY MOUTH ONCE A  DAY, Disp: 90 tablet, Rfl: 3    fenofibrate (Triglide) 160 mg tablet, Take 1 tablet (160 mg) by mouth once daily., Disp: 90 tablet, Rfl: 2    lisinopril 20 mg tablet, TAKE ONE TABLET BY MOUTH EVERY DAY, Disp: 90 tablet, Rfl: 2    pioglitazone (Actos) 30 mg tablet, Take 1 tablet (30 mg) by mouth once daily., Disp: 90 tablet, Rfl: 1    pravastatin (Pravachol) 20 mg tablet, Take 1 tablet (20 mg) by mouth once daily., Disp: 90 tablet, Rfl: 3    Allergies:  Allergies   Allergen Reactions    Amoxicillin Nausea And Vomiting    Amoxicillin-Pot Clavulanate Nausea And Vomiting    " "Doxycycline Nausea And Vomiting    Meperidine Hallucinations       PE:  Visit Vitals  /67   Pulse 59   Temp 36.3 °C (97.4 °F)   Ht 1.575 m (5' 2\")   Wt 72.1 kg (159 lb)   SpO2 97%   BMI 29.08 kg/m²   OB Status Postmenopausal   Smoking Status Every Day   BSA 1.78 m²     Physical Exam  Constitutional:       General: She is awake.      Appearance: Normal appearance. She is overweight.   HENT:      Head: Normocephalic and atraumatic.      Mouth/Throat:      Mouth: Mucous membranes are moist.      Pharynx: Oropharynx is clear.   Eyes:      Extraocular Movements: Extraocular movements intact.      Conjunctiva/sclera: Conjunctivae normal.      Pupils: Pupils are equal, round, and reactive to light.   Cardiovascular:      Rate and Rhythm: Normal rate and regular rhythm.      Heart sounds: Normal heart sounds.   Pulmonary:      Effort: Pulmonary effort is normal.      Breath sounds: Normal breath sounds.   Abdominal:      General: Abdomen is flat.      Palpations: Abdomen is soft.   Skin:     General: Skin is warm and dry.      Capillary Refill: Capillary refill takes less than 2 seconds.      Comments: Erythema of hands bilaterally on MCP joints and distally into fingers, no petechiae or other rashes noted on exam    Neurological:      General: No focal deficit present.      Mental Status: She is alert and oriented to person, place, and time. Mental status is at baseline.   Psychiatric:         Mood and Affect: Mood normal.         Behavior: Behavior normal. Behavior is cooperative.         Thought Content: Thought content normal.         Judgment: Judgment normal.           Assessment/Plan  Yuliet Granados is a 74 y.o. female who presents for \"bruising\" of the hand    Yuliet was seen today for hand problem.  Diagnoses and all orders for this visit:  Erythema of hand (Primary)  Elevated hematocrit  Tobacco use disorder     Patient has elevated hemoglobin and hematocrit likely secondary to long time tobacco use that " may be causing a possible polycythemia vera with patient currently meeting 2 major criteria of Hgb of 16.4 and hematocrit of 51.2. Will place an order for erythropoietin level. Then if she matches the 2 major and 1 minor then will send to hematology and oncology for referral. Recommended that the patient to stop smoking because this is most likely to be causing the erythema of her hands and feet but the patient has no interest in quitting at this time.    Follow up with results and for a yearly annual visit      Patient was staffed with Dr. John Anderson DO, PGY-2  [facility]

## 2024-05-10 ENCOUNTER — LAB (OUTPATIENT)
Dept: LAB | Facility: LAB | Age: 74
End: 2024-05-10
Payer: MEDICARE

## 2024-05-10 DIAGNOSIS — R71.8 ELEVATED HEMATOCRIT: ICD-10-CM

## 2024-05-10 DIAGNOSIS — D58.2 ELEVATED HEMOGLOBIN (CMS-HCC): ICD-10-CM

## 2024-05-10 PROCEDURE — 36415 COLL VENOUS BLD VENIPUNCTURE: CPT

## 2024-05-10 PROCEDURE — 82668 ASSAY OF ERYTHROPOIETIN: CPT

## 2024-05-10 ASSESSMENT — ENCOUNTER SYMPTOMS
BRUISES/BLEEDS EASILY: 0
COLOR CHANGE: 1
BLOOD IN STOOL: 0
CHILLS: 0
FATIGUE: 0
MYALGIAS: 0
HEADACHES: 0
JOINT SWELLING: 0
ARTHRALGIAS: 0
FEVER: 0
DIZZINESS: 0
LIGHT-HEADEDNESS: 0
HEMATURIA: 0

## 2024-05-10 NOTE — PROGRESS NOTES
I reviewed and examined the patient. I was present for the key exam elements, and I fully participated in the patient's care. I discussed the management of the care with the resident. I have personally reviewed the pertinent labs and imaging, as well as recent notes, with the patient. I have reviewed the note above and agree with the resident's medical decision making as documented in the resident's note, in addition to the following comments / findings:     Agree with the rest of the plan outlined below by resident physician. No red flags.      The patient understands and agrees to the assessment and plan of care. Patient has also agreed to follow up and comply with the treatment and evaluation as recommended today. Patient was instructed to call the office at [phone] should questions arise regarding their treatment or care.     Ko Lopez DO, FAOASM  Family Medicine   [address], [address]  [place] [address]     Ko Lopez DO

## 2024-05-12 LAB — EPO SERPL-ACNC: 6 MU/ML (ref 4–27)

## 2024-05-16 ENCOUNTER — TELEPHONE (OUTPATIENT)
Dept: PRIMARY CARE | Facility: CLINIC | Age: 74
End: 2024-05-16
Payer: MEDICARE

## 2024-05-16 DIAGNOSIS — R71.8 OTHER ABNORMALITY OF RED BLOOD CELLS: ICD-10-CM

## 2024-05-16 DIAGNOSIS — D75.1 POLYCYTHEMIA: Primary | ICD-10-CM

## 2024-05-16 NOTE — TELEPHONE ENCOUNTER
Wants to know why that one lab test was ordered.  It was ordered after she left and it wasn't talked about during her appointment.  Had concerns about labs. Hematocrit too.

## 2024-05-20 ENCOUNTER — LAB (OUTPATIENT)
Dept: LAB | Facility: LAB | Age: 74
End: 2024-05-20
Payer: MEDICARE

## 2024-05-20 DIAGNOSIS — D75.1 POLYCYTHEMIA: ICD-10-CM

## 2024-05-20 DIAGNOSIS — R71.8 OTHER ABNORMALITY OF RED BLOOD CELLS: ICD-10-CM

## 2024-05-20 LAB — IRON SERPL-MCNC: 166 UG/DL (ref 35–150)

## 2024-05-20 PROCEDURE — 83540 ASSAY OF IRON: CPT

## 2024-05-20 PROCEDURE — 36415 COLL VENOUS BLD VENIPUNCTURE: CPT

## 2024-07-25 ENCOUNTER — TELEPHONE (OUTPATIENT)
Dept: OTOLARYNGOLOGY | Facility: HOSPITAL | Age: 74
End: 2024-07-25
Payer: MEDICARE

## 2024-07-25 DIAGNOSIS — E04.1 THYROID NODULE: Primary | ICD-10-CM

## 2024-07-25 NOTE — TELEPHONE ENCOUNTER
Ultrasound ordered    ----- Message from Hailey BEDOYA sent at 7/25/2024  3:20 PM EDT -----    ----- Message -----  From: Dorys Rosenberg  Sent: 7/25/2024   2:26 PM EDT  To: Venus Martell RN    Patient has an appointment with Dr. Murphy on 11/12/2024 for thyroid check.  Patient needs an order for US please.  She will call next week to schedule.

## 2024-08-06 ENCOUNTER — HOSPITAL ENCOUNTER (OUTPATIENT)
Dept: RADIOLOGY | Facility: HOSPITAL | Age: 74
Discharge: HOME | End: 2024-08-06
Payer: MEDICARE

## 2024-08-06 DIAGNOSIS — E04.1 THYROID NODULE: ICD-10-CM

## 2024-08-06 PROCEDURE — 76536 US EXAM OF HEAD AND NECK: CPT

## 2024-08-06 PROCEDURE — 76536 US EXAM OF HEAD AND NECK: CPT | Performed by: RADIOLOGY

## 2024-09-06 DIAGNOSIS — E11.9 TYPE 2 DIABETES MELLITUS WITHOUT COMPLICATION, WITHOUT LONG-TERM CURRENT USE OF INSULIN (MULTI): ICD-10-CM

## 2024-09-06 RX ORDER — PIOGLITAZONEHYDROCHLORIDE 30 MG/1
30 TABLET ORAL DAILY
Qty: 90 TABLET | Refills: 0 | Status: SHIPPED | OUTPATIENT
Start: 2024-09-06

## 2024-09-07 DIAGNOSIS — I10 HYPERTENSION, UNSPECIFIED TYPE: ICD-10-CM

## 2024-09-12 ENCOUNTER — APPOINTMENT (OUTPATIENT)
Dept: RADIOLOGY | Facility: HOSPITAL | Age: 74
End: 2024-09-12
Payer: MEDICARE

## 2024-09-12 ENCOUNTER — HOSPITAL ENCOUNTER (EMERGENCY)
Facility: HOSPITAL | Age: 74
Discharge: HOME | End: 2024-09-12
Attending: EMERGENCY MEDICINE
Payer: MEDICARE

## 2024-09-12 ENCOUNTER — APPOINTMENT (OUTPATIENT)
Dept: CARDIOLOGY | Facility: HOSPITAL | Age: 74
End: 2024-09-12
Payer: MEDICARE

## 2024-09-12 VITALS
OXYGEN SATURATION: 94 % | TEMPERATURE: 97.9 F | WEIGHT: 155 LBS | DIASTOLIC BLOOD PRESSURE: 62 MMHG | RESPIRATION RATE: 17 BRPM | BODY MASS INDEX: 28.52 KG/M2 | SYSTOLIC BLOOD PRESSURE: 135 MMHG | HEART RATE: 59 BPM | HEIGHT: 62 IN

## 2024-09-12 DIAGNOSIS — R00.1 BRADYCARDIA: ICD-10-CM

## 2024-09-12 DIAGNOSIS — R42 DIZZINESS: Primary | ICD-10-CM

## 2024-09-12 DIAGNOSIS — I10 HYPERTENSION, UNSPECIFIED TYPE: ICD-10-CM

## 2024-09-12 LAB
ALBUMIN SERPL BCP-MCNC: 4.4 G/DL (ref 3.4–5)
ALP SERPL-CCNC: 51 U/L (ref 33–136)
ALT SERPL W P-5'-P-CCNC: 15 U/L (ref 7–45)
ANION GAP SERPL CALC-SCNC: 14 MMOL/L (ref 10–20)
AST SERPL W P-5'-P-CCNC: 17 U/L (ref 9–39)
BASOPHILS # BLD AUTO: 0.08 X10*3/UL (ref 0–0.1)
BASOPHILS NFR BLD AUTO: 0.9 %
BILIRUB SERPL-MCNC: 0.5 MG/DL (ref 0–1.2)
BUN SERPL-MCNC: 17 MG/DL (ref 6–23)
CALCIUM SERPL-MCNC: 9.9 MG/DL (ref 8.6–10.3)
CARDIAC TROPONIN I PNL SERPL HS: 3 NG/L (ref 0–13)
CHLORIDE SERPL-SCNC: 104 MMOL/L (ref 98–107)
CO2 SERPL-SCNC: 25 MMOL/L (ref 21–32)
CREAT SERPL-MCNC: 0.66 MG/DL (ref 0.5–1.05)
EGFRCR SERPLBLD CKD-EPI 2021: >90 ML/MIN/1.73M*2
EOSINOPHIL # BLD AUTO: 0.12 X10*3/UL (ref 0–0.4)
EOSINOPHIL NFR BLD AUTO: 1.3 %
ERYTHROCYTE [DISTWIDTH] IN BLOOD BY AUTOMATED COUNT: 15.8 % (ref 11.5–14.5)
GLUCOSE SERPL-MCNC: 89 MG/DL (ref 74–99)
HCT VFR BLD AUTO: 47 % (ref 36–46)
HGB BLD-MCNC: 15.6 G/DL (ref 12–16)
IMM GRANULOCYTES # BLD AUTO: 0.04 X10*3/UL (ref 0–0.5)
IMM GRANULOCYTES NFR BLD AUTO: 0.4 % (ref 0–0.9)
LYMPHOCYTES # BLD AUTO: 2.02 X10*3/UL (ref 0.8–3)
LYMPHOCYTES NFR BLD AUTO: 21.5 %
MCH RBC QN AUTO: 28.7 PG (ref 26–34)
MCHC RBC AUTO-ENTMCNC: 33.2 G/DL (ref 32–36)
MCV RBC AUTO: 87 FL (ref 80–100)
MONOCYTES # BLD AUTO: 0.7 X10*3/UL (ref 0.05–0.8)
MONOCYTES NFR BLD AUTO: 7.4 %
NEUTROPHILS # BLD AUTO: 6.45 X10*3/UL (ref 1.6–5.5)
NEUTROPHILS NFR BLD AUTO: 68.5 %
NRBC BLD-RTO: 0 /100 WBCS (ref 0–0)
PLATELET # BLD AUTO: 321 X10*3/UL (ref 150–450)
POTASSIUM SERPL-SCNC: 4.4 MMOL/L (ref 3.5–5.3)
PROT SERPL-MCNC: 7.1 G/DL (ref 6.4–8.2)
RBC # BLD AUTO: 5.43 X10*6/UL (ref 4–5.2)
SODIUM SERPL-SCNC: 139 MMOL/L (ref 136–145)
WBC # BLD AUTO: 9.4 X10*3/UL (ref 4.4–11.3)

## 2024-09-12 PROCEDURE — 99285 EMERGENCY DEPT VISIT HI MDM: CPT | Mod: 25

## 2024-09-12 PROCEDURE — 70450 CT HEAD/BRAIN W/O DYE: CPT | Performed by: STUDENT IN AN ORGANIZED HEALTH CARE EDUCATION/TRAINING PROGRAM

## 2024-09-12 PROCEDURE — 85025 COMPLETE CBC W/AUTO DIFF WBC: CPT

## 2024-09-12 PROCEDURE — 80053 COMPREHEN METABOLIC PANEL: CPT

## 2024-09-12 PROCEDURE — 71045 X-RAY EXAM CHEST 1 VIEW: CPT

## 2024-09-12 PROCEDURE — 84484 ASSAY OF TROPONIN QUANT: CPT

## 2024-09-12 PROCEDURE — 71045 X-RAY EXAM CHEST 1 VIEW: CPT | Performed by: STUDENT IN AN ORGANIZED HEALTH CARE EDUCATION/TRAINING PROGRAM

## 2024-09-12 PROCEDURE — 93005 ELECTROCARDIOGRAM TRACING: CPT

## 2024-09-12 PROCEDURE — 36415 COLL VENOUS BLD VENIPUNCTURE: CPT

## 2024-09-12 PROCEDURE — 70450 CT HEAD/BRAIN W/O DYE: CPT

## 2024-09-12 PROCEDURE — 2500000001 HC RX 250 WO HCPCS SELF ADMINISTERED DRUGS (ALT 637 FOR MEDICARE OP)

## 2024-09-12 RX ORDER — LISINOPRIL 20 MG/1
20 TABLET ORAL DAILY
Qty: 90 TABLET | Refills: 1 | Status: SHIPPED | OUTPATIENT
Start: 2024-09-12

## 2024-09-12 RX ORDER — MECLIZINE HYDROCHLORIDE 25 MG/1
25 TABLET ORAL ONCE
Status: COMPLETED | OUTPATIENT
Start: 2024-09-12 | End: 2024-09-12

## 2024-09-12 RX ORDER — MECLIZINE HYDROCHLORIDE 25 MG/1
25 TABLET ORAL 3 TIMES DAILY PRN
Qty: 20 TABLET | Refills: 0 | Status: SHIPPED | OUTPATIENT
Start: 2024-09-12 | End: 2024-09-22

## 2024-09-12 RX ADMIN — MECLIZINE HYDROCHLORIDE 25 MG: 25 TABLET ORAL at 18:25

## 2024-09-12 ASSESSMENT — LIFESTYLE VARIABLES
HAVE YOU EVER FELT YOU SHOULD CUT DOWN ON YOUR DRINKING: NO
HAVE PEOPLE ANNOYED YOU BY CRITICIZING YOUR DRINKING: NO
EVER FELT BAD OR GUILTY ABOUT YOUR DRINKING: NO
TOTAL SCORE: 0
EVER HAD A DRINK FIRST THING IN THE MORNING TO STEADY YOUR NERVES TO GET RID OF A HANGOVER: NO

## 2024-09-12 ASSESSMENT — COLUMBIA-SUICIDE SEVERITY RATING SCALE - C-SSRS
2. HAVE YOU ACTUALLY HAD ANY THOUGHTS OF KILLING YOURSELF?: NO
1. IN THE PAST MONTH, HAVE YOU WISHED YOU WERE DEAD OR WISHED YOU COULD GO TO SLEEP AND NOT WAKE UP?: NO
6. HAVE YOU EVER DONE ANYTHING, STARTED TO DO ANYTHING, OR PREPARED TO DO ANYTHING TO END YOUR LIFE?: NO

## 2024-09-12 ASSESSMENT — PAIN - FUNCTIONAL ASSESSMENT: PAIN_FUNCTIONAL_ASSESSMENT: 0-10

## 2024-09-12 ASSESSMENT — PAIN SCALES - GENERAL: PAINLEVEL_OUTOF10: 0 - NO PAIN

## 2024-09-12 NOTE — ED TRIAGE NOTES
"Patient c/o dizziness that started last night, patient states she feels like \"she is drunk.\" Patient denies headache, CP or SOB.   "

## 2024-09-12 NOTE — ED PROVIDER NOTES
HPI   Chief Complaint   Patient presents with    Dizziness     This is 74-year-old female here with chief complaint of lightheaded and dizziness.  She says her symptoms started yesterday around 6 PM.  She had some difficulty walking as it felt she could not walk in a straight line.  Walking has improved today.  She checked her blood pressure 3 times at home today.  Systolics 120s to 160s, diastolics 55-70.  She does take atenolol and lisinopril for her heart issues.  She is to see cardiology next week.  Denies feeling like the room is spinning.  No recent sick contacts, no fevers, no chills, no shortness of breath, no abdominal pain.      History provided by:  Patient and relative    Patient History   Past Medical History:   Diagnosis Date    Diabetes mellitus (Multi)     Personal history of other diseases of the circulatory system     History of hypertension    Personal history of other endocrine, nutritional and metabolic disease     History of diabetes mellitus    Personal history of other endocrine, nutritional and metabolic disease     History of hyperlipidemia    Personal history of other malignant neoplasm of skin     History of skin cancer    Thyroid nodule      Past Surgical History:   Procedure Laterality Date    APPENDECTOMY  1969    BREAST BIOPSY Left     benign x 2     SECTION, LOW TRANSVERSE      CT AORTA AND BILATERAL ILIOFEMORAL RUNOFF ANGIOGRAM W AND/OR WO IV CONTRAST  2023    CT AORTA AND BILATERAL ILIOFEMORAL RUNOFF ANGIOGRAM W AND/OR WO IV CONTRAST 2023 [facility] CT    HYSTERECTOMY  10/09/2014    Hysterectomy    OTHER SURGICAL HISTORY  10/04/2019    Back surgery    OTHER SURGICAL HISTORY  10/04/2019    Neck surgery     Family History   Problem Relation Name Age of Onset    Heart disease Mother Tootie Patrick     Heart disease Father Arun Patrick      Social History     Tobacco Use    Smoking status: Every Day     Current packs/day: 0.50     Types: Cigarettes    Smokeless  "tobacco: Never   Vaping Use    Vaping status: Never Used   Substance Use Topics    Alcohol use: Never    Drug use: Never       Physical Exam   Visit Vitals  /62   Pulse 59   Temp 36.6 °C (97.9 °F) (Temporal)   Resp 17   Ht 1.575 m (5' 2\")   Wt 70.3 kg (155 lb)   SpO2 94%   BMI 28.35 kg/m²   OB Status Postmenopausal   Smoking Status Every Day   BSA 1.75 m²          Physical Exam  Vitals reviewed.   HENT:      Head: Normocephalic and atraumatic.   Eyes:      Extraocular Movements: Extraocular movements intact.      Right eye: No nystagmus.      Left eye: No nystagmus.      Pupils: Pupils are equal, round, and reactive to light.   Cardiovascular:      Rate and Rhythm: Normal rate and regular rhythm.      Heart sounds: Normal heart sounds. No murmur heard.     No gallop.   Pulmonary:      Breath sounds: Normal breath sounds.   Abdominal:      General: Abdomen is flat. There is no distension.      Palpations: Abdomen is soft.      Tenderness: There is no abdominal tenderness.   Musculoskeletal:      Right lower leg: No edema.      Left lower leg: No edema.   Skin:     General: Skin is warm and dry.   Neurological:      General: No focal deficit present.      Mental Status: She is alert and oriented to person, place, and time. Mental status is at baseline.      Cranial Nerves: Cranial nerves 2-12 are intact. No cranial nerve deficit or facial asymmetry.      Sensory: No sensory deficit.      Motor: No weakness.      Coordination: Heel to Shin Test normal.      Comments: NIHSS 0   Psychiatric:         Mood and Affect: Mood normal.         Behavior: Behavior normal.           ED Course & MDM   ED Course as of 09/12/24 1851   u Sep 12, 2024   1702 EKG interpreted by myself.  Heart rate 54.  No ST abnormalities.  Sinus bradycardia. [BROOKE]   1704 CBC and Auto Differential(!) [BROOKE]   1728 Troponin I, High Sensitivity [BROOKE]   1755 XR chest 1 view [BROOKE]   1817 CT head wo IV contrast [BROOKE]      ED Course User Index  [BROOKE] Myles " DO Sarah         Diagnoses as of 09/12/24 1851   Dizziness   Bradycardia       No data recorded        Medical Decision Making  This 74-year-old female who presents to the [facility] ED with chief complaint of lightheadedness/dizziness.  Differential diagnosis includes syncope versus stroke versus BPPV.  Physical exam was unremarkable.  NIH stroke score was 0.  Test of skew negative for nystagmus.  She does have a history of polycythemia vera per chart review.  EKG showed sinus bradycardia with a heart rate of 54.  Troponin 3. Chest x-ray was unremarkable.  CT head showed no acute intracranial abnormality.  She did have a polypoid mucosal opacification of the right sphenoid sinus.  Given unremarkable workup, patient was agreeable to discharge.  We did have her attempt walking in the ER prior to discharge to ensure patient safety.  She will follow-up with cardiology next week.  We recommended that her atenolol be reduced to 12.5 mg due to her bradycardia with close follow-up to cardiology.  We will also send her out with meclizine for her dizziness as she did have symptomatic relief with it during visit.    No social determinate of health identified.    Amount and/or Complexity of Data Reviewed  External Data Reviewed: labs and notes.  Labs: ordered.  Radiology: ordered.  ECG/medicine tests: ordered.        Procedure  Procedures     Myles Smith DO  Resident  09/12/24 1851

## 2024-09-12 NOTE — ED PROVIDER NOTES
The patient was seen by the midlevel/resident.  I have personally saw the patient and made/approved the management plan and take responsibility for the patient management.  I reviewed the EKG's (when done) and agree with the interpretation.  I have seen and examined the patient; agree with the workup, evaluation, MDM, and diagnosis.  The care plan has been discussed with the midlevel/resident; I have reviewed the note and agree with the documented findings.     Patient presents with lightheadedness dizziness.  I suspect it is positional vertigo.  Slight worse when she moves her head.  It is better today than it was last night plan for started.  She has never had this before.  Negative test of skew.  NIH was negative.  Clinically not finding signs of posterior circulation stroke.  Awaiting results.  Results came back patient is improved on ambulation is stable for discharge.  ED Course as of 09/12/24 1917   u Sep 12, 2024   1702 EKG interpreted by myself.  Heart rate 54.  No ST abnormalities.  Sinus bradycardia. [BROOKE]   1704 CBC and Auto Differential(!) [BROOKE]   1728 Troponin I, High Sensitivity [BROOKE]   1755 XR chest 1 view [BROOKE]   1817 CT head wo IV contrast [BROOKE]      ED Course User Index  [BROOKE] Myles Smith, DO         Diagnoses as of 09/12/24 1917   Dizziness   Bradycardia     MD Rashid Gooden MD  09/12/24 1822       Rashid Browning MD  09/12/24 1917

## 2024-09-12 NOTE — DISCHARGE INSTRUCTIONS
You were seen in the ED for dizziness.  Workup was negative.  We gave you meclizine for your dizziness.    We recommend that you reduce your dose of atenolol to 12.5 mg daily due to your slow heart rate.  Follow-up with cardiology next week who will continue to manage your heart issues.     If your dizziness continues to persist, we recommend seeing physical therapy for vestibular therapy.     We also sent you meclizine (Antivert) for your dizziness. You can take 25mg up to 3 times a day as needed.

## 2024-09-19 LAB
ATRIAL RATE: 54 BPM
P AXIS: 56 DEGREES
P OFFSET: 170 MS
P ONSET: 148 MS
PR INTERVAL: 152 MS
Q ONSET: 224 MS
QRS COUNT: 9 BEATS
QRS DURATION: 74 MS
QT INTERVAL: 436 MS
QTC CALCULATION(BAZETT): 413 MS
QTC FREDERICIA: 420 MS
R AXIS: 62 DEGREES
T AXIS: 27 DEGREES
T OFFSET: 442 MS
VENTRICULAR RATE: 54 BPM

## 2024-09-23 ENCOUNTER — APPOINTMENT (OUTPATIENT)
Dept: PRIMARY CARE | Facility: CLINIC | Age: 74
End: 2024-09-23
Payer: MEDICARE

## 2024-10-03 DIAGNOSIS — E78.5 HYPERLIPIDEMIA, UNSPECIFIED HYPERLIPIDEMIA TYPE: ICD-10-CM

## 2024-10-03 RX ORDER — PRAVASTATIN SODIUM 20 MG/1
20 TABLET ORAL DAILY
Qty: 90 TABLET | Refills: 1 | Status: SHIPPED | OUTPATIENT
Start: 2024-10-03

## 2024-10-14 ENCOUNTER — APPOINTMENT (OUTPATIENT)
Dept: RADIOLOGY | Facility: HOSPITAL | Age: 74
End: 2024-10-14
Payer: MEDICARE

## 2024-10-14 ENCOUNTER — HOSPITAL ENCOUNTER (EMERGENCY)
Facility: HOSPITAL | Age: 74
Discharge: HOME | End: 2024-10-14
Payer: MEDICARE

## 2024-10-14 VITALS
RESPIRATION RATE: 20 BRPM | HEART RATE: 62 BPM | BODY MASS INDEX: 28.52 KG/M2 | TEMPERATURE: 97.9 F | SYSTOLIC BLOOD PRESSURE: 141 MMHG | WEIGHT: 155 LBS | OXYGEN SATURATION: 94 % | HEIGHT: 62 IN | DIASTOLIC BLOOD PRESSURE: 74 MMHG

## 2024-10-14 DIAGNOSIS — S20.212A CONTUSION OF RIB ON LEFT SIDE, INITIAL ENCOUNTER: Primary | ICD-10-CM

## 2024-10-14 PROCEDURE — 70450 CT HEAD/BRAIN W/O DYE: CPT

## 2024-10-14 PROCEDURE — 70450 CT HEAD/BRAIN W/O DYE: CPT | Performed by: RADIOLOGY

## 2024-10-14 PROCEDURE — 99285 EMERGENCY DEPT VISIT HI MDM: CPT | Mod: 25

## 2024-10-14 PROCEDURE — 71250 CT THORAX DX C-: CPT | Performed by: RADIOLOGY

## 2024-10-14 PROCEDURE — 71250 CT THORAX DX C-: CPT

## 2024-10-14 PROCEDURE — 2500000001 HC RX 250 WO HCPCS SELF ADMINISTERED DRUGS (ALT 637 FOR MEDICARE OP): Performed by: PHYSICIAN ASSISTANT

## 2024-10-14 PROCEDURE — 72125 CT NECK SPINE W/O DYE: CPT | Performed by: RADIOLOGY

## 2024-10-14 PROCEDURE — 72125 CT NECK SPINE W/O DYE: CPT

## 2024-10-14 RX ORDER — METHOCARBAMOL 500 MG/1
500 TABLET, FILM COATED ORAL EVERY 8 HOURS
Qty: 15 TABLET | Refills: 0 | Status: SHIPPED | OUTPATIENT
Start: 2024-10-14 | End: 2024-10-19

## 2024-10-14 RX ORDER — ACETAMINOPHEN 325 MG/1
975 TABLET ORAL ONCE
Status: COMPLETED | OUTPATIENT
Start: 2024-10-14 | End: 2024-10-14

## 2024-10-14 RX ORDER — METHOCARBAMOL 500 MG/1
500 TABLET, FILM COATED ORAL ONCE
Status: COMPLETED | OUTPATIENT
Start: 2024-10-14 | End: 2024-10-14

## 2024-10-14 ASSESSMENT — PAIN DESCRIPTION - LOCATION: LOCATION: RIB CAGE

## 2024-10-14 ASSESSMENT — PAIN DESCRIPTION - PAIN TYPE
TYPE: ACUTE PAIN
TYPE: ACUTE PAIN

## 2024-10-14 ASSESSMENT — LIFESTYLE VARIABLES
EVER FELT BAD OR GUILTY ABOUT YOUR DRINKING: NO
TOTAL SCORE: 0
EVER HAD A DRINK FIRST THING IN THE MORNING TO STEADY YOUR NERVES TO GET RID OF A HANGOVER: NO
HAVE YOU EVER FELT YOU SHOULD CUT DOWN ON YOUR DRINKING: NO
HAVE PEOPLE ANNOYED YOU BY CRITICIZING YOUR DRINKING: NO

## 2024-10-14 ASSESSMENT — COLUMBIA-SUICIDE SEVERITY RATING SCALE - C-SSRS
1. IN THE PAST MONTH, HAVE YOU WISHED YOU WERE DEAD OR WISHED YOU COULD GO TO SLEEP AND NOT WAKE UP?: NO
6. HAVE YOU EVER DONE ANYTHING, STARTED TO DO ANYTHING, OR PREPARED TO DO ANYTHING TO END YOUR LIFE?: NO
2. HAVE YOU ACTUALLY HAD ANY THOUGHTS OF KILLING YOURSELF?: NO

## 2024-10-14 ASSESSMENT — PAIN SCALES - GENERAL
PAINLEVEL_OUTOF10: 4
PAINLEVEL_OUTOF10: 7

## 2024-10-14 ASSESSMENT — PAIN - FUNCTIONAL ASSESSMENT: PAIN_FUNCTIONAL_ASSESSMENT: 0-10

## 2024-10-14 ASSESSMENT — PAIN DESCRIPTION - DESCRIPTORS: DESCRIPTORS: ACHING

## 2024-10-14 ASSESSMENT — PAIN DESCRIPTION - ORIENTATION: ORIENTATION: LEFT

## 2024-10-14 NOTE — ED TRIAGE NOTES
Patient tripped and fell into a door frame on Wednesday. Patient hit the left side of her head and left rib cage on the frame, she was fine at first but is now c/o worsening rib pain, patient denies LOC, not on blood thinners.

## 2024-10-14 NOTE — ED PROVIDER NOTES
HPI   Chief Complaint   Patient presents with    Rib Injury       Well-appearing 74-year-old female presenting after a purely mechanical fall.  Patient fell on Wednesday of last week.  She fell into a door frame after she turned too fast while going down and then switching to going up the stairs.  She did hit her head she did not lose consciousness she is not on blood thinners.  She has had progressively worsening left chest wall pain.  No shortness of breath.  Increased pain with sneezing and coughing.  She has had no nausea vomiting constipation or diarrhea.  No other chest pain or abdominal pain.              Patient History   Past Medical History:   Diagnosis Date    Diabetes mellitus (Multi)     Personal history of other diseases of the circulatory system     History of hypertension    Personal history of other endocrine, nutritional and metabolic disease     History of diabetes mellitus    Personal history of other endocrine, nutritional and metabolic disease     History of hyperlipidemia    Personal history of other malignant neoplasm of skin     History of skin cancer    Thyroid nodule      Past Surgical History:   Procedure Laterality Date    APPENDECTOMY      BREAST BIOPSY Left     benign x 2     SECTION, LOW TRANSVERSE      CT AORTA AND BILATERAL ILIOFEMORAL RUNOFF ANGIOGRAM W AND/OR WO IV CONTRAST  2023    CT AORTA AND BILATERAL ILIOFEMORAL RUNOFF ANGIOGRAM W AND/OR WO IV CONTRAST 2023 [facility]    HYSTERECTOMY  10/09/2014    Hysterectomy    OTHER SURGICAL HISTORY  10/04/2019    Back surgery    OTHER SURGICAL HISTORY  10/04/2019    Neck surgery     Family History   Problem Relation Name Age of Onset    Heart disease Mother Tootie Patrick     Heart disease Father Arun Patrick      Social History     Tobacco Use    Smoking status: Every Day     Current packs/day: 0.50     Types: Cigarettes    Smokeless tobacco: Never   Vaping Use    Vaping status: Never Used   Substance Use  Topics    Alcohol use: Never    Drug use: Never       Physical Exam   ED Triage Vitals [10/14/24 0943]   Temperature Heart Rate Respirations BP   36.6 °C (97.9 °F) 65 20 155/71      Pulse Ox Temp Source Heart Rate Source Patient Position   94 % Temporal Monitor --      BP Location FiO2 (%)     -- --       Physical Exam  Constitutional:       General: She is not in acute distress.     Appearance: She is not ill-appearing or toxic-appearing.   HENT:      Head: Normocephalic and atraumatic.      Right Ear: Tympanic membrane normal.      Left Ear: Tympanic membrane normal.      Nose: Nose normal.      Mouth/Throat:      Mouth: Mucous membranes are moist.   Eyes:      Extraocular Movements: Extraocular movements intact.      Pupils: Pupils are equal, round, and reactive to light.   Cardiovascular:      Rate and Rhythm: Normal rate and regular rhythm.      Pulses: Normal pulses.      Heart sounds: Murmur heard.      No friction rub. No gallop.      Comments: Murmur is known to patient  Pulmonary:      Effort: Pulmonary effort is normal. No respiratory distress.      Breath sounds: Normal breath sounds. No wheezing, rhonchi or rales.      Comments: 2 cm x 2 cm ecchymotic area of left breast at the inframammary fold.  Tenderness along the lateral ribs.  No crepitus noted.  No abdominal pain.   Chest:      Chest wall: Tenderness present.   Abdominal:      General: There is no distension.      Palpations: Abdomen is soft.      Tenderness: There is no abdominal tenderness. There is no guarding.   Musculoskeletal:         General: No swelling, tenderness, deformity or signs of injury. Normal range of motion.      Cervical back: Normal range of motion. No tenderness.   Skin:     General: Skin is warm and dry.      Capillary Refill: Capillary refill takes less than 2 seconds.   Neurological:      General: No focal deficit present.      Mental Status: She is alert and oriented to person, place, and time.      Motor: No weakness.    Psychiatric:         Mood and Affect: Mood normal.         Behavior: Behavior normal.           ED Course & MDM   Diagnoses as of 10/14/24 1203   Contusion of rib on left side, initial encounter                 No data recorded     [place] Coma Scale Score: 15 (10/14/24 1035 : Olga Lidia Bonds)                           Medical Decision Making  Healthy appearing 74-year-old female presenting with left rib and breast pain.  Given presenting complaints as well as striking head CT head, C-spine and chest were obtained.  The patient has no abdominal pain.  She is been tolerating a diet.  There is no ecchymosis to the abdomen.  She does have localized tenderness to palpation along the fourth the fifth intercostal areas laterally.  She does have a small area of ecchymosis on the breast just near the inframammary fold.  She is satting well on room air.  CT scan was personally reviewed and was reviewed by the radiologist.  She does have mild atelectatic changes to the left lung however she does not have any hemopneumothorax.  She does not have any displaced rib fracture.  At this time we will discharge the patient home.  She did receive a dose of Robaxin as well as Tylenol in the emergency department and feels that her pain is better controlled.  She will be discharged home with an incentive spirometer.  She has follow-up with her PCP as needed.        Procedure  Procedures     Chuckie Cano PA-C  10/14/24 1204

## 2024-10-21 ENCOUNTER — TELEPHONE (OUTPATIENT)
Dept: PRIMARY CARE | Facility: CLINIC | Age: 74
End: 2024-10-21
Payer: MEDICARE

## 2024-10-21 DIAGNOSIS — S39.012S BACK STRAIN, SEQUELA: Primary | ICD-10-CM

## 2024-10-21 RX ORDER — CYCLOBENZAPRINE HCL 10 MG
10 TABLET ORAL NIGHTLY PRN
Qty: 30 TABLET | Refills: 0 | Status: SHIPPED | OUTPATIENT
Start: 2024-10-21 | End: 2024-12-20

## 2024-10-21 NOTE — TELEPHONE ENCOUNTER
Patient had gone to the ER for a fall.  They did a CT Scan.  Patient looked at the results and saw gallstones and hiatal hernia.  Does she need to do US in follow up?  Also was given muscle relaxer there and has two left.  Still in a lot of pain.  Hurts to move.  Does she need to come in for follow up.? Call after 1:00

## 2024-10-21 NOTE — TELEPHONE ENCOUNTER
Hospital gave her Methocarbamol 500mg take one every 8 hours PRN.  Its not really helping.  She is scheduled to come in on Thursday,  any other option

## 2024-10-24 ENCOUNTER — OFFICE VISIT (OUTPATIENT)
Dept: PRIMARY CARE | Facility: CLINIC | Age: 74
End: 2024-10-24
Payer: MEDICARE

## 2024-10-24 ENCOUNTER — TELEPHONE (OUTPATIENT)
Dept: PRIMARY CARE | Facility: CLINIC | Age: 74
End: 2024-10-24

## 2024-10-24 VITALS
DIASTOLIC BLOOD PRESSURE: 80 MMHG | BODY MASS INDEX: 29.44 KG/M2 | SYSTOLIC BLOOD PRESSURE: 132 MMHG | WEIGHT: 160 LBS | HEIGHT: 62 IN | OXYGEN SATURATION: 97 % | HEART RATE: 52 BPM

## 2024-10-24 DIAGNOSIS — E04.1 THYROID NODULE: ICD-10-CM

## 2024-10-24 DIAGNOSIS — S20.212A RIB CONTUSION, LEFT, INITIAL ENCOUNTER: Primary | ICD-10-CM

## 2024-10-24 DIAGNOSIS — R73.03 PRE-DIABETES: Primary | ICD-10-CM

## 2024-10-24 DIAGNOSIS — K80.20 GALLBLADDER STONE WITHOUT CHOLECYSTITIS OR OBSTRUCTION: ICD-10-CM

## 2024-10-24 DIAGNOSIS — K44.9 HIATAL HERNIA: ICD-10-CM

## 2024-10-24 ASSESSMENT — PATIENT HEALTH QUESTIONNAIRE - PHQ9
2. FEELING DOWN, DEPRESSED OR HOPELESS: NOT AT ALL
SUM OF ALL RESPONSES TO PHQ9 QUESTIONS 1 AND 2: 0
1. LITTLE INTEREST OR PLEASURE IN DOING THINGS: NOT AT ALL

## 2024-10-24 NOTE — TELEPHONE ENCOUNTER
Meg said that she forgot to ask you while she was here if she should get her lab work done.  She said that you usually have her do it every 6 months.    She asked if you do put the orders in that you also check her A1c

## 2024-10-24 NOTE — PROGRESS NOTES
Clinic Note: Family Medicine    Patient: Yuliet Granados  Encounter Date: 10/24/24  Subjective:  Chief Complaint   Patient presents with    Follow-up     History of Present Illness:  Yuliet Granados is a 74 y.o. female with past history of hypertension, hyperlipidemia, type 2 diabetes who presents for follow-up ED visit on 10/14/2024.    Patient fell on Wednesday, 10/9/2024.  She fell into a door frame after she turned too fast.  She did hit her head.  Patient noted progressively worsening left chest wall pain.  CT head was unremarkable.  CT cervical spine was unremarkable.  CT chest did not reveal any displaced rib fracture but noted possible scarring or atelectasis scattered in both lung fields.  Incidental findings of 1.7 cm hypodense nodule in the left lobe of the thyroid and faint dense possible adherent stones or calcification at the gallbladder fundus.  Patient was discharged with robaxan and Tylenol   As well as incentive spirometry.    Today, patient notes tenderness over her left chest wall but improved.  She felt that the Tylenol and robaxin was helpful.  She has been using incentive spirometry and able to breathe deeply. Denies headache, dizziness, nausea, vomiting, balance difficulty, photophobia, phonophobia, neck pain, rotation of neck.  We discussed incidental findings of 1.7 cm hypodense nodule in the left lobe of thyroid.  She follows with Dr. Murphy Endocrinologist who has done ultrasound and biopsy noting benign nodule.  She will continue to follow-up with endocrinology.  We also discussed the faint dense possible adherent stone or calcification at the gallbladder fundus.  LFTs 9/2024 were normal.  Patient denies any cholestasis or colic symptoms.  In addition, she has no epigastric pain.  Given findings of small hiatal hernia.    Review of Systems:  See HPI    Past Medical History:   Past Medical History:   Diagnosis Date    Diabetes mellitus (Multi)     Personal history of other diseases of the  circulatory system     History of hypertension    Personal history of other endocrine, nutritional and metabolic disease     History of diabetes mellitus    Personal history of other endocrine, nutritional and metabolic disease     History of hyperlipidemia    Personal history of other malignant neoplasm of skin     History of skin cancer    Thyroid nodule        Past Surgical History:   Procedure Laterality Date    APPENDECTOMY  1969    BREAST BIOPSY Left     benign x 2     SECTION, LOW TRANSVERSE  1971    CT AORTA AND BILATERAL ILIOFEMORAL RUNOFF ANGIOGRAM W AND/OR WO IV CONTRAST  2023    CT AORTA AND BILATERAL ILIOFEMORAL RUNOFF ANGIOGRAM W AND/OR WO IV CONTRAST 2023 [facility]    HYSTERECTOMY  10/09/2014    Hysterectomy    OTHER SURGICAL HISTORY  10/04/2019    Back surgery    OTHER SURGICAL HISTORY  10/04/2019    Neck surgery       Family History   Problem Relation Name Age of Onset    Heart disease Mother Tootie Patrick     Heart disease Father Arun Patrick        Social History     Socioeconomic History    Marital status:      Spouse name: Not on file    Number of children: Not on file    Years of education: Not on file    Highest education level: Not on file   Occupational History    Not on file   Tobacco Use    Smoking status: Every Day     Current packs/day: 0.50     Types: Cigarettes    Smokeless tobacco: Never   Vaping Use    Vaping status: Never Used   Substance and Sexual Activity    Alcohol use: Never    Drug use: Never    Sexual activity: Not Currently     Partners: Male   Other Topics Concern    Not on file   Social History Narrative    Not on file     Social Drivers of Health     Financial Resource Strain: Not on file   Food Insecurity: Not on file   Transportation Needs: Not on file   Physical Activity: Not on file   Stress: Not on file   Social Connections: Not on file   Intimate Partner Violence: Not on file   Housing Stability: Not on file       Medications:  Patient's  "Medications   New Prescriptions    No medications on file   Previous Medications    ATENOLOL (TENORMIN) 25 MG TABLET    TAKE ONE TABLET BY MOUTH ONCE A  DAY    CYCLOBENZAPRINE (FLEXERIL) 10 MG TABLET    Take 1 tablet (10 mg) by mouth as needed at bedtime for muscle spasms.    FENOFIBRATE (TRIGLIDE) 160 MG TABLET    Take 1 tablet (160 mg) by mouth once daily.    LISINOPRIL 20 MG TABLET    TAKE ONE TABLET BY MOUTH EVERY DAY    METHOCARBAMOL (ROBAXIN) 500 MG TABLET    Take 1 tablet (500 mg) by mouth every 8 hours for 5 days.    PIOGLITAZONE (ACTOS) 30 MG TABLET    Take 1 tablet by mouth once daily    PRAVASTATIN (PRAVACHOL) 20 MG TABLET    TAKE ONE TABLET BY MOUTH ONCE A  DAY   Modified Medications    No medications on file   Discontinued Medications    No medications on file         Objective:    /80   Pulse 52   Ht 1.575 m (5' 2\")   Wt 72.6 kg (160 lb)   SpO2 97%   BMI 29.26 kg/m²     Physical Exam  Constitutional:       General: She is not in acute distress.     Appearance: Normal appearance. She is not ill-appearing, toxic-appearing or diaphoretic.   Cardiovascular:      Rate and Rhythm: Normal rate and regular rhythm.      Pulses: Normal pulses.      Heart sounds: Normal heart sounds. No murmur heard.     No friction rub. No gallop.   Pulmonary:      Effort: Pulmonary effort is normal. No respiratory distress.      Breath sounds: Normal breath sounds. No stridor. No wheezing, rhonchi or rales.   Chest:      Chest wall: Tenderness (Left chest wall tenderness) present.   Neurological:      Mental Status: She is alert.       Results Reviewed:      10/14/2024: CT head wo contrast  No acute intracranial hemorrhage or mass-effect     10/14/2024: CT cervical wo contrast   No cervical vertebral compression deformity or acute displaced  fracture. Status post ACDF C5-C7. Multilevel productive/degenerative  changes and mild spondylolisthesis at the cervicothoracic junction.    10/14/2024: CT chest wo contrast   No " displaced rib fracture, focal infiltrate, pleural effusion or  pneumothorax. Linear or bandlike possible scarring or atelectasis  scattered in both lung fields.      1.7 cm hypodense nodule in the left lobe of the thyroid. Further  evaluation with ultrasound recommended.      Faint dense possible adherent stones or calcification at the  gallbladder fundus. Further evaluation with ultrasound recommended.      Small hiatal hernia. Proximal gastric diverticulum.    Assessment and Plan:       1. Rib contusion, left, initial encounter (Primary)  Patient fell on Wednesday, 10/9/2024.  She fell into a door frame after she turned too fast.  She did hit her head.  Patient noted progressively worsening left chest wall pain. CT chest did not reveal any displaced rib fracture but noted possible scarring or atelectasis scattered in both lung fields. Pain left chest wall under control. Breathing deeply without difficulty. Adherent to incentive spirometry.  - Voltaren gel and tylenol as needed  - continue incentive spirometry     2. Thyroid nodule  We discussed incidental findings of 1.7 cm hypodense nodule in the left lobe of thyroid.  She follows with Dr. Murphy Endocrinologist who has done ultrasound and biopsy noting benign nodule.  She will continue to follow-up with endocrinology.    3. Hiatal hernia  Incidental findings of small hiatal hernia. she has no epigastric pain.      4. Gallbladder stone without cholecystitis or obstruction  We also discussed the faint dense possible adherent stone or calcification at the gallbladder fundus.  LFTs 9/2024 were normal.  Patient denies any cholestasis or colic symptoms.    Return to clinic as needed. Patient understands and agrees to plan. All questions and concerns were addressed.    Michael Smith MD  Primary Care Sports Medicine Fellow  [facility] [place]  [facility]

## 2024-11-01 ENCOUNTER — LAB (OUTPATIENT)
Dept: LAB | Facility: LAB | Age: 74
End: 2024-11-01
Payer: MEDICARE

## 2024-11-01 DIAGNOSIS — R73.03 PRE-DIABETES: ICD-10-CM

## 2024-11-01 LAB
EST. AVERAGE GLUCOSE BLD GHB EST-MCNC: 148 MG/DL
HBA1C MFR BLD: 6.8 %

## 2024-11-01 PROCEDURE — 83036 HEMOGLOBIN GLYCOSYLATED A1C: CPT

## 2024-11-12 ENCOUNTER — APPOINTMENT (OUTPATIENT)
Dept: OTOLARYNGOLOGY | Facility: CLINIC | Age: 74
End: 2024-11-12
Payer: MEDICARE

## 2024-12-07 DIAGNOSIS — E11.9 TYPE 2 DIABETES MELLITUS WITHOUT COMPLICATION, WITHOUT LONG-TERM CURRENT USE OF INSULIN (MULTI): ICD-10-CM

## 2024-12-09 RX ORDER — PIOGLITAZONEHYDROCHLORIDE 30 MG/1
30 TABLET ORAL DAILY
Qty: 90 TABLET | Refills: 0 | Status: SHIPPED | OUTPATIENT
Start: 2024-12-09

## 2025-01-02 DIAGNOSIS — E78.5 HYPERLIPIDEMIA, UNSPECIFIED HYPERLIPIDEMIA TYPE: ICD-10-CM

## 2025-01-02 RX ORDER — FENOFIBRATE 160 MG/1
160 TABLET ORAL DAILY
Qty: 90 TABLET | Refills: 0 | Status: SHIPPED | OUTPATIENT
Start: 2025-01-02

## 2025-02-20 ENCOUNTER — APPOINTMENT (OUTPATIENT)
Dept: PRIMARY CARE | Facility: CLINIC | Age: 75
End: 2025-02-20
Payer: MEDICARE

## 2025-02-20 VITALS
BODY MASS INDEX: 28.52 KG/M2 | WEIGHT: 155 LBS | HEART RATE: 68 BPM | DIASTOLIC BLOOD PRESSURE: 80 MMHG | HEIGHT: 62 IN | OXYGEN SATURATION: 95 % | SYSTOLIC BLOOD PRESSURE: 144 MMHG

## 2025-02-20 DIAGNOSIS — I10 HYPERTENSION, UNSPECIFIED TYPE: ICD-10-CM

## 2025-02-20 DIAGNOSIS — E78.5 HYPERLIPIDEMIA, UNSPECIFIED HYPERLIPIDEMIA TYPE: ICD-10-CM

## 2025-02-20 DIAGNOSIS — R53.83 FATIGUE, UNSPECIFIED TYPE: ICD-10-CM

## 2025-02-20 DIAGNOSIS — Z23 ENCOUNTER FOR IMMUNIZATION: ICD-10-CM

## 2025-02-20 DIAGNOSIS — Z00.00 ROUTINE GENERAL MEDICAL EXAMINATION AT HEALTH CARE FACILITY: ICD-10-CM

## 2025-02-20 DIAGNOSIS — F17.200 TOBACCO DEPENDENCY: ICD-10-CM

## 2025-02-20 DIAGNOSIS — Z13.220 SCREENING FOR HYPERLIPIDEMIA: ICD-10-CM

## 2025-02-20 DIAGNOSIS — M85.852 OSTEOPENIA OF LEFT HIP: ICD-10-CM

## 2025-02-20 DIAGNOSIS — E11.9 TYPE 2 DIABETES MELLITUS WITHOUT COMPLICATION, WITHOUT LONG-TERM CURRENT USE OF INSULIN (MULTI): Primary | ICD-10-CM

## 2025-02-20 DIAGNOSIS — Z12.31 ENCOUNTER FOR SCREENING MAMMOGRAM FOR BREAST CANCER: ICD-10-CM

## 2025-02-20 PROBLEM — N60.19 FIBROCYSTIC BREAST CHANGES: Status: ACTIVE | Noted: 2025-02-20

## 2025-02-20 PROBLEM — Z86.39 HISTORY OF DIABETES MELLITUS: Status: ACTIVE | Noted: 2025-02-20

## 2025-02-20 PROBLEM — Z85.828 HISTORY OF MALIGNANT NEOPLASM OF SKIN: Status: ACTIVE | Noted: 2025-02-20

## 2025-02-20 PROBLEM — Z86.79 HISTORY OF HYPERTENSION: Status: ACTIVE | Noted: 2025-02-20

## 2025-02-20 PROBLEM — Z86.39 HISTORY OF ELEVATED LIPIDS: Status: ACTIVE | Noted: 2025-02-20

## 2025-02-20 RX ORDER — ATENOLOL 25 MG/1
12.5 TABLET ORAL DAILY
Qty: 45 TABLET | Refills: 3 | Status: CANCELLED | OUTPATIENT
Start: 2025-02-20

## 2025-02-20 RX ORDER — POTASSIUM &MAGNESIUM ASPARTATE 250-250 MG
500 CAPSULE ORAL DAILY
COMMUNITY

## 2025-02-20 RX ORDER — PRAVASTATIN SODIUM 20 MG/1
20 TABLET ORAL DAILY
Qty: 90 TABLET | Refills: 1 | Status: SHIPPED | OUTPATIENT
Start: 2025-02-20

## 2025-02-20 RX ORDER — FENOFIBRATE 160 MG/1
160 TABLET ORAL DAILY
Qty: 90 TABLET | Refills: 0 | Status: SHIPPED | OUTPATIENT
Start: 2025-02-20

## 2025-02-20 RX ORDER — LISINOPRIL 20 MG/1
20 TABLET ORAL DAILY
Qty: 90 TABLET | Refills: 1 | Status: SHIPPED | OUTPATIENT
Start: 2025-02-20

## 2025-02-20 ASSESSMENT — ENCOUNTER SYMPTOMS
OCCASIONAL FEELINGS OF UNSTEADINESS: 0
LOSS OF SENSATION IN FEET: 0
DEPRESSION: 0

## 2025-02-20 ASSESSMENT — ACTIVITIES OF DAILY LIVING (ADL)
BATHING: INDEPENDENT
MANAGING_FINANCES: INDEPENDENT
DRESSING: INDEPENDENT
GROCERY_SHOPPING: INDEPENDENT
TAKING_MEDICATION: INDEPENDENT
DOING_HOUSEWORK: INDEPENDENT

## 2025-02-20 ASSESSMENT — PATIENT HEALTH QUESTIONNAIRE - PHQ9
SUM OF ALL RESPONSES TO PHQ9 QUESTIONS 1 AND 2: 0
2. FEELING DOWN, DEPRESSED OR HOPELESS: NOT AT ALL
1. LITTLE INTEREST OR PLEASURE IN DOING THINGS: NOT AT ALL

## 2025-02-20 NOTE — ASSESSMENT & PLAN NOTE
Most is left hip T -1.6. Hip frax 2.8%. Major osteoporosis 11%. Obtain repeat DEXA  Orders:    XR DEXA bone density; Future

## 2025-02-20 NOTE — PROGRESS NOTES
"Subjective   Reason for Visit: Yuliet Granados is an 74 y.o. female here for a Medicare Wellness visit.        Reviewed all medications by prescribing practitioner or clinical pharmacist (such as prescriptions, OTCs, herbal therapies and supplements) and documented in the medical record.    HPI    Type 2 diabetes  - 11/2024 6.8% A1c  - Taking pioglitazone 30 mg daily without adverse effects  - History of pancreatitis (idiopathic)  - Denies sugary drinks or sweets.  However, does consume significant carbs-potatoes, bread, pasta    Hyperlipidemia  - Taking pravastatin 20 mg daily and fenofibrate 160 mg daily without adverse effects    Hypertension  - Average home blood pressure 135/71, she takes it once a week  - Taking lisinopril 20 mg once daily without adverse effects  - Denies dizziness, lightheadedness, headaches    Flu shot declined  COVID shot x 1  She has not obtained RSV, pneumococcal, Shingrix vaccination    She is a current smoker of 10 pack/day. Counseled on smoking cessation    Cervical cancer screening: She had a total hysterectomy for fibroids and endometriosis  Breast cancer screening and colon cancer screening: She will discuss with Dr. Estrada    Thyroid nodule: incidental findings of 1.7 cm hypodense nodule in the left lobe of thyroid.  She follows with Dr. Murphy Endocrinologist who has done ultrasound and biopsy noting benign nodule.  She will continue to follow-up with endocrinology.    Patient Care Team:  Ko Lopez DO as PCP - General     Objective   Vitals:  /80   Pulse 68   Ht 1.575 m (5' 2\")   Wt 70.3 kg (155 lb)   SpO2 95%   BMI 28.35 kg/m²       Physical Exam  Constitutional:       General: She is not in acute distress.     Appearance: Normal appearance. She is not ill-appearing, toxic-appearing or diaphoretic.   HENT:      Head: Normocephalic and atraumatic.   Cardiovascular:      Rate and Rhythm: Normal rate and regular rhythm.      Pulses: Normal pulses.      Heart sounds: " Normal heart sounds. No murmur heard.     No friction rub. No gallop.   Pulmonary:      Effort: Pulmonary effort is normal. No respiratory distress.      Breath sounds: Normal breath sounds. No stridor. No wheezing, rhonchi or rales.   Musculoskeletal:      Right lower leg: No edema.      Left lower leg: No edema.   Skin:     Capillary Refill: Capillary refill takes less than 2 seconds.   Neurological:      Mental Status: She is alert.         Assessment & Plan  Hyperlipidemia, unspecified hyperlipidemia type  Doing well. refill  Orders:    pravastatin (Pravachol) 20 mg tablet; Take 1 tablet (20 mg) by mouth once daily.    fenofibrate (Triglide) 160 mg tablet; Take 1 tablet (160 mg) by mouth once daily.    Hypertension, unspecified type  Doing well. Refill.   Orders:    lisinopril 20 mg tablet; Take 1 tablet (20 mg) by mouth once daily.    Osteopenia of left hip  Most is left hip T -1.6. Hip frax 2.8%. Major osteoporosis 11%. Obtain repeat DEXA  Orders:    XR DEXA bone density; Future    Type 2 diabetes mellitus without complication, without long-term current use of insulin (Multi)    Orders:    Hemoglobin A1C - Lab collect; Future  - 11/2024 6.8% A1c  - Taking pioglitazone 30 mg daily without adverse effects  - History of pancreatitis (idiopathic)  - Denies sugary drinks or sweets.  However, does consume significant carbs-potatoes, bread, pasta  We are going to do a trial of discontinuation diabetic medications and decreasing consumption of carbohydrates.  Patient instructed to obtain labs in 3 months to see her A1c.  If above 7.5%, need to consider adding back diabetic medications.  She reported intolerant of metformin and due to cost not able to do Jardiance.  Routine general medical examination at health care facility    Orders:    1 Year Follow Up In Primary Care - Wellness Exam; Future    Comprehensive Metabolic Panel; Future    Screening for hyperlipidemia    Orders:    Lipid Panel; Future    Fatigue,  unspecified type    Orders:    CBC; Future    Tobacco dependency  She is a current smoker of 10 pack/day. Counseled on smoking cessation       Encounter for immunization    Orders:    Pneumococcal conjugate vaccine, 20-valent (PREVNAR 20)  Patient should obtain RSV vaccination next fall and whenever she gets a chance to obtain Shingrix.     Low Dose CT Screening  We discussed the pros and cons of low dose CT screening for lung cancer based on the patient's smoking history.  This screening is recommended for patients who:  Are between the age of 50 to 77  Have a 20 pack-year smoking history (20 years of smoking a pack a day)  Are either a current smoker or have quit smoking within the last 15 years  Are in good health - no new cough or unexplained weight loss  Are willing to do the follow-up testing and treatment, if needed  Have not had a chest CT (CAT) scan in the last year    Tobacco Counseling  3 - 5 minutes were spent counseling the patient on tobacco cessation.  Benefits of cessation were discussed as well as techniques to help quit.

## 2025-02-20 NOTE — PATIENT INSTRUCTIONS
Pneumococcal vaccine  RSV vaccine  Shingle vaccine    Quitting Smoking    Quitting smoking is the most important step you can take to improve your health. We're glad you have set a goal to improve your health.    Quit Smoking Resources    In addition to medications, use the STAR plan to help you successfully quit.   Stick with your quit date!   Tell friends, family, and coworkers your quit date. Request their understanding and support.  Anticipate and prepare for challenges. Some examples are withdrawal symptoms, being around others who smoke, and drinking alcohol.  Remove all tobacco products and paraphernalia from your environment. Make your home and vehicles smoke-free.    Free resources for additional support:  National tobacco quitline: 1-800-QUIT-NOW (1-681.672.3122).  SmokefreeTXT is a free text program to assist you in quitting. Visit https://www.smokefree.gov/smokefreetxt for more information.  Feel free to call your care manager at ([phone]) for additional support.

## 2025-03-03 ENCOUNTER — HOSPITAL ENCOUNTER (OUTPATIENT)
Dept: RADIOLOGY | Facility: HOSPITAL | Age: 75
Discharge: HOME | End: 2025-03-03
Payer: MEDICARE

## 2025-03-03 VITALS — HEIGHT: 62 IN | BODY MASS INDEX: 28.52 KG/M2 | WEIGHT: 155 LBS

## 2025-03-03 DIAGNOSIS — M85.852 OSTEOPENIA OF LEFT HIP: ICD-10-CM

## 2025-03-03 DIAGNOSIS — Z12.31 ENCOUNTER FOR SCREENING MAMMOGRAM FOR BREAST CANCER: ICD-10-CM

## 2025-03-03 PROCEDURE — 77067 SCR MAMMO BI INCL CAD: CPT

## 2025-03-03 PROCEDURE — 77080 DXA BONE DENSITY AXIAL: CPT

## 2025-03-03 PROCEDURE — 77067 SCR MAMMO BI INCL CAD: CPT | Performed by: RADIOLOGY

## 2025-03-03 PROCEDURE — 77063 BREAST TOMOSYNTHESIS BI: CPT | Performed by: RADIOLOGY

## 2025-03-05 ENCOUNTER — TELEPHONE (OUTPATIENT)
Dept: PRIMARY CARE | Facility: CLINIC | Age: 75
End: 2025-03-05
Payer: MEDICARE

## 2025-03-05 DIAGNOSIS — M81.0 OSTEOPOROSIS, UNSPECIFIED OSTEOPOROSIS TYPE, UNSPECIFIED PATHOLOGICAL FRACTURE PRESENCE: Primary | ICD-10-CM

## 2025-03-05 RX ORDER — ALENDRONATE SODIUM 70 MG/1
70 TABLET ORAL
Qty: 4 TABLET | Refills: 11 | Status: SHIPPED | OUTPATIENT
Start: 2025-03-05 | End: 2026-03-05

## 2025-03-05 NOTE — TELEPHONE ENCOUNTER
Patient canceled medication sent to [facility] because she does not use it can you resend it to [facility] in [place]   Alendronate 70 mg

## 2025-03-06 ENCOUNTER — APPOINTMENT (OUTPATIENT)
Dept: SURGERY | Facility: CLINIC | Age: 75
End: 2025-03-06
Payer: MEDICARE

## 2025-03-06 VITALS
DIASTOLIC BLOOD PRESSURE: 66 MMHG | WEIGHT: 154.2 LBS | OXYGEN SATURATION: 98 % | BODY MASS INDEX: 28.37 KG/M2 | SYSTOLIC BLOOD PRESSURE: 127 MMHG | TEMPERATURE: 96.7 F | HEIGHT: 62 IN | HEART RATE: 52 BPM

## 2025-03-06 DIAGNOSIS — N60.19 FIBROCYSTIC BREAST CHANGES, UNSPECIFIED LATERALITY: Primary | ICD-10-CM

## 2025-03-06 PROCEDURE — 99214 OFFICE O/P EST MOD 30 MIN: CPT | Performed by: SURGERY

## 2025-03-06 PROCEDURE — 1159F MED LIST DOCD IN RCRD: CPT | Performed by: SURGERY

## 2025-03-06 NOTE — TELEPHONE ENCOUNTER
Patient got a call from Pharmacy [facility].  Patient had no idea what the medication was for.  Dr Smith had put note in Mychart but the patient had not seen so was confused as to what the script was.  She did look up the med and was concerned with some of the side effects.  Could she do OTC VitaminD and Calcium.?  Patient available before 1 and after 3. New pharmacy is [facility]

## 2025-03-06 NOTE — PROGRESS NOTES
Subjective   Patient ID: Yuliet Granados is a 74 y.o. female who presents for Follow-up (EST Annual Breast Exam).  HPI this is a pleasant patient I have seen for many years for her mammogram follow-up and also for her screening colonoscopies.  Her last colonoscopy was in 2018 and she did have a small tubular adenoma and some hyperplastic polyps.  Therefore she is due for another one.  She has no complaints related to the breast and no changes in the family history.    Review of Systems 10 point review is otherwise negative    Objective   Physical Exam head is normocephalic atraumatic eyes extraocular movements are in tact examination of the breasts reveal the breasts are symmetric there are no skin changes no nipple discharge and no retraction.  There are no palpable masses in either breast.  Axillae are negative and supraclavicular fossae are negative.  Lungs are clear.  Heart is regular rate and rhythm.    Mammogram is unchanged.    Assessment/Plan I recommend a mammogram again in 1 year and followed by clinical breast exam.  We also discussed colonoscopy including the risks of bleeding perforation and missed lesion and she agrees to proceed.           Aletha Estrada MD 03/06/25 3:02 PM

## 2025-03-20 ASSESSMENT — ENCOUNTER SYMPTOMS
DYSURIA: 0
ARTHRALGIAS: 0
HEMATURIA: 0
NAUSEA: 0
HEADACHES: 0
CONSTIPATION: 0
FLATUS: 0
FEVER: 0
BELCHING: 0
HEMATOCHEZIA: 0
ABDOMINAL PAIN: 1
WEIGHT LOSS: 0
DIARRHEA: 0
FREQUENCY: 0
MYALGIAS: 0
VOMITING: 0
ANOREXIA: 0

## 2025-03-25 ENCOUNTER — APPOINTMENT (OUTPATIENT)
Dept: PRIMARY CARE | Facility: CLINIC | Age: 75
End: 2025-03-25
Payer: MEDICARE

## 2025-03-25 VITALS
HEART RATE: 61 BPM | SYSTOLIC BLOOD PRESSURE: 118 MMHG | WEIGHT: 150 LBS | OXYGEN SATURATION: 96 % | BODY MASS INDEX: 27.6 KG/M2 | HEIGHT: 62 IN | DIASTOLIC BLOOD PRESSURE: 74 MMHG

## 2025-03-25 DIAGNOSIS — R10.11 RUQ PAIN: ICD-10-CM

## 2025-03-25 DIAGNOSIS — E11.9 TYPE 2 DIABETES MELLITUS WITHOUT COMPLICATION, WITHOUT LONG-TERM CURRENT USE OF INSULIN: ICD-10-CM

## 2025-03-25 DIAGNOSIS — R10.13 EPIGASTRIC PAIN: ICD-10-CM

## 2025-03-25 DIAGNOSIS — R12 HEARTBURN: Primary | ICD-10-CM

## 2025-03-25 DIAGNOSIS — I10 HYPERTENSION, UNSPECIFIED TYPE: ICD-10-CM

## 2025-03-25 PROCEDURE — 99214 OFFICE O/P EST MOD 30 MIN: CPT

## 2025-03-25 PROCEDURE — 3078F DIAST BP <80 MM HG: CPT

## 2025-03-25 PROCEDURE — 3074F SYST BP LT 130 MM HG: CPT

## 2025-03-25 RX ORDER — CHOLECALCIFEROL (VITAMIN D3) 25 MCG
1000 TABLET ORAL DAILY
COMMUNITY

## 2025-03-25 RX ORDER — BLOOD-GLUCOSE SENSOR
EACH MISCELLANEOUS
Qty: 5 EACH | Refills: 11 | Status: SHIPPED | OUTPATIENT
Start: 2025-03-25

## 2025-03-25 RX ORDER — CALCIUM CARBONATE 500(1250)
1 TABLET,CHEWABLE ORAL DAILY
COMMUNITY

## 2025-03-25 RX ORDER — OMEPRAZOLE 20 MG/1
20 TABLET, DELAYED RELEASE ORAL
Qty: 90 TABLET | Refills: 1 | COMMUNITY
Start: 2025-03-25 | End: 2025-09-21

## 2025-03-25 ASSESSMENT — PATIENT HEALTH QUESTIONNAIRE - PHQ9
1. LITTLE INTEREST OR PLEASURE IN DOING THINGS: NOT AT ALL
SUM OF ALL RESPONSES TO PHQ9 QUESTIONS 1 AND 2: 0
2. FEELING DOWN, DEPRESSED OR HOPELESS: NOT AT ALL

## 2025-03-25 NOTE — PROGRESS NOTES
"Chief Complaint   Patient presents with    Abdominal Pain     Dull ache upper stomach-she was looking at her results from the hospital from nov and she seen she has gallstones and hiatal hernia ????     Subjective   - Reason for visit: Abdominal pain and follow-up on incidental findings from previous imaging  - Chief complaint: Dull, achy pain in upper abdomen  - Duration: Couple of weeks  - Location: Upper abdomen, sometimes radiating to right upper quadrant  - Timing: Random, more noticeable in the morning after hours without eating  - Associated symptoms: No nausea, vomiting, or changes in bowel movements  - Impact on daily activities: Not specified  - Previous episodes: None mentioned    CT chest from 10/2024 showed:  - Slightly nodular liver contour. Faint densities near  the gallbladder fundus. 2.7 cm proximal gastric diverticulum  containing air-fluid level.  - Small hiatal hernia. Proximal gastric diverticulum.     Objective   /74   Pulse 61   Ht 1.575 m (5' 2\")   Wt 68 kg (150 lb)   SpO2 96%   BMI 27.44 kg/m²     Physical Exam  Vitals reviewed.   Constitutional:       Appearance: Normal appearance.   HENT:      Head: Normocephalic and atraumatic.   Eyes:      Extraocular Movements: Extraocular movements intact.      Pupils: Pupils are equal, round, and reactive to light.   Cardiovascular:      Rate and Rhythm: Normal rate and regular rhythm.      Heart sounds: Normal heart sounds.   Pulmonary:      Effort: Pulmonary effort is normal. No respiratory distress.      Breath sounds: Normal breath sounds.   Abdominal:      Tenderness: There is abdominal tenderness in the epigastric area.   Musculoskeletal:      Right lower leg: No edema.      Left lower leg: No edema.   Neurological:      Mental Status: She is alert and oriented to person, place, and time.   Psychiatric:         Mood and Affect: Mood normal.         Behavior: Behavior normal.       Assessment/Plan     1. Abdominal pain  - Possible " cholecystitis, PUD/acid reflux, pancreatitis  - Plan: RUQ US, CMP, Lipase    Colin Tran, DO  PGY-2 Family Medicine

## 2025-03-26 LAB
ALBUMIN SERPL-MCNC: 4.5 G/DL (ref 3.6–5.1)
ALP SERPL-CCNC: 77 U/L (ref 37–153)
ALT SERPL-CCNC: 17 U/L (ref 6–29)
ANION GAP SERPL CALCULATED.4IONS-SCNC: 12 MMOL/L (CALC) (ref 7–17)
AST SERPL-CCNC: 14 U/L (ref 10–35)
BILIRUB SERPL-MCNC: 0.5 MG/DL (ref 0.2–1.2)
BUN SERPL-MCNC: 25 MG/DL (ref 7–25)
CALCIUM SERPL-MCNC: 10.2 MG/DL (ref 8.6–10.4)
CHLORIDE SERPL-SCNC: 101 MMOL/L (ref 98–110)
CO2 SERPL-SCNC: 24 MMOL/L (ref 20–32)
CREAT SERPL-MCNC: 0.69 MG/DL (ref 0.6–1)
EGFRCR SERPLBLD CKD-EPI 2021: 90 ML/MIN/1.73M2
GLUCOSE SERPL-MCNC: 391 MG/DL (ref 65–99)
LIPASE SERPL-CCNC: 19 U/L (ref 7–60)
POTASSIUM SERPL-SCNC: 5 MMOL/L (ref 3.5–5.3)
PROT SERPL-MCNC: 6.8 G/DL (ref 6.1–8.1)
SODIUM SERPL-SCNC: 137 MMOL/L (ref 135–146)

## 2025-03-26 NOTE — PROGRESS NOTES
I reviewed and examined the patient. I was present for the key exam elements, and I fully participated in the patient's care. I discussed the management of the care with the resident. I have personally reviewed the pertinent labs and imaging, as well as recent notes, with the patient. I have reviewed the note above and agree with the resident's medical decision making as documented in the resident's note, in addition to the following comments / findings:     Agree with the rest of the plan outlined below by resident physician. No red flags.      The patient understands and agrees to the assessment and plan of care. Patient has also agreed to follow up and comply with the treatment and evaluation as recommended today. Patient was instructed to call the office at [phone] should questions arise regarding their treatment or care.     Ko Lopez DO, FAOASM  Family Medicine   [address], [address]  [place] [address]     Ko Lopez DO

## 2025-03-27 ENCOUNTER — HOSPITAL ENCOUNTER (OUTPATIENT)
Dept: RADIOLOGY | Facility: HOSPITAL | Age: 75
Discharge: HOME | End: 2025-03-27
Payer: MEDICARE

## 2025-03-27 DIAGNOSIS — R10.11 RUQ PAIN: ICD-10-CM

## 2025-03-27 PROCEDURE — 76705 ECHO EXAM OF ABDOMEN: CPT

## 2025-04-01 ENCOUNTER — TELEPHONE (OUTPATIENT)
Facility: CLINIC | Age: 75
End: 2025-04-01
Payer: MEDICARE

## 2025-04-01 DIAGNOSIS — R93.89 ABNORMAL ULTRASOUND: Primary | ICD-10-CM

## 2025-04-01 NOTE — TELEPHONE ENCOUNTER
Patient called about testing that was done.  She did see it was in but hadnt heard back from anyone.  Patient will be home until 1:00.  Patient did not fast. Patient was taking off of diabetic meds.

## 2025-04-02 ENCOUNTER — OFFICE VISIT (OUTPATIENT)
Facility: CLINIC | Age: 75
End: 2025-04-02
Payer: MEDICARE

## 2025-04-02 VITALS
DIASTOLIC BLOOD PRESSURE: 80 MMHG | OXYGEN SATURATION: 97 % | HEART RATE: 78 BPM | BODY MASS INDEX: 26.4 KG/M2 | WEIGHT: 149 LBS | SYSTOLIC BLOOD PRESSURE: 128 MMHG | HEIGHT: 63 IN

## 2025-04-02 DIAGNOSIS — K75.81 NASH (NONALCOHOLIC STEATOHEPATITIS): ICD-10-CM

## 2025-04-02 DIAGNOSIS — Z86.39 HISTORY OF DIABETES MELLITUS: Primary | ICD-10-CM

## 2025-04-02 DIAGNOSIS — I10 HYPERTENSION, UNSPECIFIED TYPE: ICD-10-CM

## 2025-04-02 DIAGNOSIS — E78.5 HYPERLIPIDEMIA, UNSPECIFIED HYPERLIPIDEMIA TYPE: ICD-10-CM

## 2025-04-02 LAB — POC HEMOGLOBIN A1C: 9.5 % (ref 4.2–6.5)

## 2025-04-02 PROCEDURE — 99214 OFFICE O/P EST MOD 30 MIN: CPT

## 2025-04-02 PROCEDURE — 3079F DIAST BP 80-89 MM HG: CPT

## 2025-04-02 PROCEDURE — 1159F MED LIST DOCD IN RCRD: CPT

## 2025-04-02 PROCEDURE — 3074F SYST BP LT 130 MM HG: CPT

## 2025-04-02 PROCEDURE — 83036 HEMOGLOBIN GLYCOSYLATED A1C: CPT

## 2025-04-02 RX ORDER — PIOGLITAZONEHYDROCHLORIDE 30 MG/1
30 TABLET ORAL DAILY
Qty: 90 TABLET | Refills: 0 | Status: SHIPPED | OUTPATIENT
Start: 2025-04-02

## 2025-04-02 RX ORDER — ATENOLOL 25 MG/1
25 TABLET ORAL DAILY
Start: 2025-04-02

## 2025-04-02 NOTE — PROGRESS NOTES
Chief Complaint  Patient ID: Yuliet Granados is a 74 y.o. female who presents for Follow-up.    Past Medical, Surgical, and Family History reviewed and updated in chart.    Reviewed all medications by prescribing practitioner or clinical pharmacist (such as prescriptions, OTCs, herbal therapies and supplements) and documented in the medical record.    History of Present Illness  1. Type II DM  Yuliet recently had blood work with a CMP notable for a glucose of 391  Previous hemoglobin A1c was 6.8% one year prior. She reports being taken off of her diabetic medications. Previously was taking pioglitazone and has a history of pancreatitis     Hyperlipidemia  - Taking pravastatin 20 mg daily and fenofibrate 160 mg daily without adverse effects     Hypertension  - Average home blood pressure 135/71, she takes it once a week  - Taking lisinopril 20 mg once daily without adverse effects  - Denies dizziness, lightheadedness, headaches    2. RUQ abdominal pain      Review of Systems  All pertinent positive symptoms are included in the history of present illness.    All other systems have been reviewed and are negative and noncontributory to this patient's current ailments.    Past Medical History  She has a past medical history of Breast cyst (), Diabetes mellitus (Multi), Personal history of other diseases of the circulatory system, Personal history of other endocrine, nutritional and metabolic disease, Personal history of other endocrine, nutritional and metabolic disease, Personal history of other malignant neoplasm of skin, and Thyroid nodule ().    Surgical History  She has a past surgical history that includes Hysterectomy (); Other surgical history (10/04/2019); Other surgical history (10/04/2019); CT angio aorta and bilateral iliofemoral runoff including without contrast if performed (2023); Breast biopsy (Left); Appendectomy ();  section, low transverse (); and Breast cyst  "aspiration (1990s).     Social History  She reports that she has been smoking cigarettes. She has never used smokeless tobacco. She reports that she does not drink alcohol and does not use drugs.    Family History   Problem Relation Name Age of Onset    Heart disease Mother Tootie Patrick     Heart disease Father Arun Patrick      Outpatient Medications Prior to Visit   Medication Sig Dispense Refill    calcium carbonate 500 mg calcium (1,250 mg) chewable tablet Chew 1 tablet (1,250 mg) once daily.      cholecalciferol (Vitamin D3) 25 mcg (1000 units) tablet Take 1 tablet (1,000 Units) by mouth once daily.      cranberry 500 mg capsule Take 500 capsules by mouth once daily.      fenofibrate (Triglide) 160 mg tablet Take 1 tablet (160 mg) by mouth once daily. 90 tablet 0    lisinopril 20 mg tablet Take 1 tablet (20 mg) by mouth once daily. 90 tablet 1    omeprazole OTC (PriLOSEC OTC) 20 mg EC tablet Take 1 tablet (20 mg) by mouth once daily in the morning. Take before meals. Do not crush, chew, or split. 90 tablet 1    pravastatin (Pravachol) 20 mg tablet Take 1 tablet (20 mg) by mouth once daily. 90 tablet 1    Dexcom G7 Sensor device Reapply every ten days. (Patient not taking: Reported on 4/2/2025) 5 each 11    atenolol (Tenormin) 25 mg tablet TAKE ONE TABLET BY MOUTH ONCE A  DAY (Patient taking differently: Take 1 tablet (25 mg) by mouth once daily. 12.5mg) 90 tablet 3     No facility-administered medications prior to visit.     Allergies  Amoxicillin, Amoxicillin-pot clavulanate, Doxycycline, and Meperidine    Immunization History   Administered Date(s) Administered    [facility] SARS-CoV-2 Vaccination 03/07/2021    Pneumococcal conjugate vaccine, 20-valent (PREVNAR 20) 02/20/2025     Objective   Visit Vitals  /80   Pulse 78   Ht 1.6 m (5' 3\")   Wt 67.6 kg (149 lb)   SpO2 97%   BMI 26.39 kg/m²   OB Status Postmenopausal   Smoking Status Every Day   BSA 1.73 m²        BP Readings from Last 3 Encounters: "   04/02/25 128/80   03/25/25 118/74   03/06/25 127/66      Wt Readings from Last 3 Encounters:   04/02/25 67.6 kg (149 lb)   03/25/25 68 kg (150 lb)   03/06/25 69.9 kg (154 lb 3.2 oz)      Vision  No results found.    Relevant Results  Office Visit on 04/02/2025   Component Date Value    POC HEMOGLOBIN A1c 04/02/2025 9.5 (A)    Office Visit on 03/25/2025   Component Date Value    GLUCOSE 03/26/2025 391 (H)     UREA NITROGEN (BUN) 03/26/2025 25     CREATININE 03/26/2025 0.69     EGFR 03/26/2025 90     SODIUM 03/26/2025 137     POTASSIUM 03/26/2025 5.0     CHLORIDE 03/26/2025 101     CARBON DIOXIDE 03/26/2025 24     ELECTROLYTE BALANCE 03/26/2025 12     CALCIUM 03/26/2025 10.2     PROTEIN, TOTAL 03/26/2025 6.8     ALBUMIN 03/26/2025 4.5     BILIRUBIN, TOTAL 03/26/2025 0.5     ALKALINE PHOSPHATASE 03/26/2025 77     AST 03/26/2025 14     ALT 03/26/2025 17     LIPASE 03/26/2025 19      The 10-year ASCVD risk score (Dee SAEED, et al., 2019) is: 52.5%    Values used to calculate the score:      Age: 74 years      Sex: Female      Is Non- : No      Diabetic: Yes      Tobacco smoker: Yes      Systolic Blood Pressure: 128 mmHg      Is BP treated: Yes      HDL Cholesterol: 63.4 mg/dL      Total Cholesterol: 201 mg/dL    Physical Exam  CONSTITUTIONAL - well nourished, well developed, looks like stated age, in no acute distress, not ill-appearing, and not tired appearing  SKIN - normal skin color and pigmentation, normal skin turgor without rash, lesions, or nodules visualized  HEAD - no trauma, normocephalic  EYES - pupils are equal and reactive to light, extraocular muscles are intact, and normal external exam  ENT - TM's intact, no injection, no signs of infection, uvula midline, normal tongue movement and throat normal, no exudate, nasal passage without discharge and patent  NECK - supple without rigidity, no neck mass was observed, no thyromegaly or thyroid nodules  CHEST - clear to auscultation, no  wheezing, no crackles and no rales, good effort  CARDIAC - regular rate and regular rhythm, no skipped beats, no murmur  ABDOMEN - no organomegaly, soft, nontender, nondistended, normal bowel sounds, no guarding/rebound/rigidity, negative McBurney sign and negative Blount sign  RECTAL - prostate is smooth, not enlarged, nontender, no masses or nodules palpable; normal sphincter tone, no rectal masses  EXTREMITIES - no obvious or evident edema, no obvious or evident deformities  NEUROLOGICAL - normal gait, normal balance, normal motor, no ataxia, DTRs equal and symmetrical; alert, oriented and no focal signs  PSYCHIATRIC - alert, pleasant and cordial, age-appropriate  IMMUNOLOGIC - no cervical lymphadenopathy    Assessment and Plan  Problem List Items Addressed This Visit       History of diabetes mellitus - Primary    Relevant Orders    POCT glycosylated hemoglobin (Hb A1C) manually resulted (Completed)     Other Visit Diagnoses       Hypertension, unspecified type        Relevant Medications    atenolol (Tenormin) 25 mg tablet

## 2025-04-02 NOTE — PROGRESS NOTES
I reviewed and examined the patient. I was present for the key exam elements, and I fully participated in the patient's care. I discussed the management of the care with the resident. I have personally reviewed the pertinent labs and imaging, as well as recent notes, with the patient. I have reviewed the note above and agree with the resident's medical decision making as documented in the resident's note, in addition to the following comments / findings:     Agree with the rest of the plan outlined below by resident physician. No red flags.      The patient understands and agrees to the assessment and plan of care. Patient has also agreed to follow up and comply with the treatment and evaluation as recommended today. Patient was instructed to call the office at [phone] should questions arise regarding their treatment or care.     Ko Lopez DO, FAOASM  Family Medicine   [address], [address]  [place] [address]     Ko Lopez DO

## 2025-04-02 NOTE — PROGRESS NOTES
Chief Complaint  Patient ID: Yuliet Granados is a 74 y.o. female who presents for Follow-up.    Past Medical, Surgical, and Family History reviewed and updated in chart.    Reviewed all medications by prescribing practitioner or clinical pharmacist (such as prescriptions, OTCs, herbal therapies and supplements) and documented in the medical record.    History of Present Illness  1.  Potential NAFLD  She made an appointment for the Liver MRI on the 16th of April, but will reschedule it due to her colonoscopy on April 18th.   RUQ US showed: Heterogenous hepatic echotexture with slight contour irregularity could be related to underlying diffuse hepatocellular dysfunction/steatosis without gross lesions. Gallbladder polyp measuring 0.4 cm.     2. Diabetes mellitus  Last HbA1c on 11/1/24: 6.8%, stable from prior.   Yuliet was told to come in and go over everything regarding her labs, and she did not do fasting for this, she ate breakfast before she went. She has fasted today and her glucose was rechecked in office, her A1c is 9.5%. She endorses that she eats more carbohydrates. She refuses to take metformin, based on her research and she cannot afford Jardiance, so would not be willing to take it at this time.     She was previously on pioglitazone 30 mg which she tolerated well. She was taken off of the medication by a previous provider. Yuliet would rather resume pioglitazone then starting a new medication with metformin or Farxiga.    3. GERD  She takes omeprazole as prescribed and notices that it does not hurt as much and is helping. She is also avoiding acidic and spicy foods to help with the management of the reflux.    4. Hypertension  Takes atenolol 25 mg daily. Tolerates well and denies chest pain, SOB and ACS symptoms.    Review of Systems  All pertinent positive symptoms are included in the history of present illness.    All other systems have been reviewed and are negative and noncontributory to this  patient's current ailments.    Past Medical History  She has a past medical history of Breast cyst (), Diabetes mellitus (Multi), Personal history of other diseases of the circulatory system, Personal history of other endocrine, nutritional and metabolic disease, Personal history of other endocrine, nutritional and metabolic disease, Personal history of other malignant neoplasm of skin, and Thyroid nodule ().    Surgical History  She has a past surgical history that includes Hysterectomy (); Other surgical history (10/04/2019); Other surgical history (10/04/2019); CT angio aorta and bilateral iliofemoral runoff including without contrast if performed (2023); Breast biopsy (Left); Appendectomy ();  section, low transverse (); and Breast cyst aspiration ().     Social History  She reports that she has been smoking cigarettes. She has never used smokeless tobacco. She reports that she does not drink alcohol and does not use drugs.    Family History   Problem Relation Name Age of Onset    Heart disease Mother Tootie Patrick     Heart disease Father Arnu Patrick      Outpatient Medications Prior to Visit   Medication Sig Dispense Refill    calcium carbonate 500 mg calcium (1,250 mg) chewable tablet Chew 1 tablet (1,250 mg) once daily.      cholecalciferol (Vitamin D3) 25 mcg (1000 units) tablet Take 1 tablet (1,000 Units) by mouth once daily.      cranberry 500 mg capsule Take 500 capsules by mouth once daily.      fenofibrate (Triglide) 160 mg tablet Take 1 tablet (160 mg) by mouth once daily. 90 tablet 0    lisinopril 20 mg tablet Take 1 tablet (20 mg) by mouth once daily. 90 tablet 1    omeprazole OTC (PriLOSEC OTC) 20 mg EC tablet Take 1 tablet (20 mg) by mouth once daily in the morning. Take before meals. Do not crush, chew, or split. 90 tablet 1    pravastatin (Pravachol) 20 mg tablet Take 1 tablet (20 mg) by mouth once daily. 90 tablet 1    Dexcom G7 Sensor device Reapply  "every ten days. (Patient not taking: Reported on 4/2/2025) 5 each 11    atenolol (Tenormin) 25 mg tablet TAKE ONE TABLET BY MOUTH ONCE A  DAY (Patient taking differently: Take 1 tablet (25 mg) by mouth once daily. 12.5mg) 90 tablet 3     No facility-administered medications prior to visit.     Allergies  Amoxicillin, Amoxicillin-pot clavulanate, Doxycycline, and Meperidine    Immunization History   Administered Date(s) Administered    [facility] SARS-CoV-2 Vaccination 03/07/2021    Pneumococcal conjugate vaccine, 20-valent (PREVNAR 20) 02/20/2025     Objective   Visit Vitals  /80   Pulse 78   Ht 1.6 m (5' 3\")   Wt 67.6 kg (149 lb)   SpO2 97%   BMI 26.39 kg/m²   OB Status Postmenopausal   Smoking Status Every Day   BSA 1.73 m²        BP Readings from Last 3 Encounters:   04/02/25 128/80   03/25/25 118/74   03/06/25 127/66      Wt Readings from Last 3 Encounters:   04/02/25 67.6 kg (149 lb)   03/25/25 68 kg (150 lb)   03/06/25 69.9 kg (154 lb 3.2 oz)      Vision  No results found.    Relevant Results  Office Visit on 04/02/2025   Component Date Value    POC HEMOGLOBIN A1c 04/02/2025 9.5 (A)    Office Visit on 03/25/2025   Component Date Value    GLUCOSE 03/26/2025 391 (H)     UREA NITROGEN (BUN) 03/26/2025 25     CREATININE 03/26/2025 0.69     EGFR 03/26/2025 90     SODIUM 03/26/2025 137     POTASSIUM 03/26/2025 5.0     CHLORIDE 03/26/2025 101     CARBON DIOXIDE 03/26/2025 24     ELECTROLYTE BALANCE 03/26/2025 12     CALCIUM 03/26/2025 10.2     PROTEIN, TOTAL 03/26/2025 6.8     ALBUMIN 03/26/2025 4.5     BILIRUBIN, TOTAL 03/26/2025 0.5     ALKALINE PHOSPHATASE 03/26/2025 77     AST 03/26/2025 14     ALT 03/26/2025 17     LIPASE 03/26/2025 19      The 10-year ASCVD risk score (Dee SAEED, et al., 2019) is: 52.5%    Values used to calculate the score:      Age: 74 years      Sex: Female      Is Non- : No      Diabetic: Yes      Tobacco smoker: Yes      Systolic Blood Pressure: 128 mmHg      Is " BP treated: Yes      HDL Cholesterol: 63.4 mg/dL      Total Cholesterol: 201 mg/dL    Physical Exam  CONSTITUTIONAL - well nourished, well developed, looks like stated age, in no acute distress, not ill-appearing, and not tired appearing  SKIN - normal skin color and pigmentation, normal skin turgor without rash, lesions, or nodules visualized  HEAD - no trauma, normocephalic  EYES - extraocular muscles are intact, and normal external exam  NECK - supple without rigidity, no neck mass was observed, no thyromegaly or thyroid nodules  CHEST - clear to auscultation, no wheezing, no crackles and no rales, good effort  CARDIAC - regular rate and regular rhythm, no skipped beats, no murmur  EXTREMITIES - no obvious or evident edema, no obvious or evident deformities  NEUROLOGICAL - alert, oriented and no focal signs  PSYCHIATRIC - alert, pleasant and cordial, age-appropriate    Assessment and Plan  Problem List Items Addressed This Visit       History of diabetes mellitus - Primary    Relevant Orders    POCT glycosylated hemoglobin (Hb A1C) manually resulted (Completed)     Other Visit Diagnoses       Hypertension, unspecified type        Relevant Medications    atenolol (Tenormin) 25 mg tablet          Type II DM; A1c 9.5%  Will restart pioglitazone 30 mg given maura previously tolerated medication  Recheck hemoglobin A1c in 3 months  If hemoglobin A1c >7%, Meg is amenable to starting SGLT2 depending on her insurance    2. Hypertension  Your blood pressure is at goal!   Continue atenolol 12.5 mg and lisinopril 20 mg daily     3. Hyperlipidemia   Continue fenofibrate as prescribed     4. HENLEY   MRI of liver ordered   Referred to GI for further follow-up  Pioglitazone will be helpful medication     Follow-up  in 3 months for repeat hemoglobin A1c  Alecia Matias DO  PGY-2  Family Medicine

## 2025-04-08 DIAGNOSIS — R93.2 ABNORMAL CT OF LIVER: Primary | ICD-10-CM

## 2025-04-16 ENCOUNTER — HOSPITAL ENCOUNTER (OUTPATIENT)
Dept: RADIOLOGY | Facility: HOSPITAL | Age: 75
Discharge: HOME | End: 2025-04-16
Payer: MEDICARE

## 2025-04-16 DIAGNOSIS — R93.89 ABNORMAL ULTRASOUND: ICD-10-CM

## 2025-04-16 PROCEDURE — 2550000001 HC RX 255 CONTRASTS: Performed by: FAMILY MEDICINE

## 2025-04-16 PROCEDURE — 74183 MRI ABD W/O CNTR FLWD CNTR: CPT

## 2025-04-16 PROCEDURE — A9575 INJ GADOTERATE MEGLUMI 0.1ML: HCPCS | Performed by: FAMILY MEDICINE

## 2025-04-16 RX ORDER — GADOTERATE MEGLUMINE 376.9 MG/ML
0.2 INJECTION INTRAVENOUS
Status: COMPLETED | OUTPATIENT
Start: 2025-04-16 | End: 2025-04-16

## 2025-04-16 RX ADMIN — GADOTERATE MEGLUMINE 13 ML: 376.9 INJECTION INTRAVENOUS at 08:57

## 2025-04-18 ENCOUNTER — APPOINTMENT (OUTPATIENT)
Dept: OPERATING ROOM | Facility: HOSPITAL | Age: 75
End: 2025-04-18
Payer: MEDICARE

## 2025-04-18 DIAGNOSIS — K86.89 PANCREATIC DUCT DILATED (HHS-HCC): Primary | ICD-10-CM

## 2025-04-21 ENCOUNTER — APPOINTMENT (OUTPATIENT)
Facility: CLINIC | Age: 75
End: 2025-04-21
Payer: MEDICARE

## 2025-04-21 VITALS
WEIGHT: 146 LBS | SYSTOLIC BLOOD PRESSURE: 118 MMHG | BODY MASS INDEX: 26.87 KG/M2 | HEART RATE: 70 BPM | HEIGHT: 62 IN | DIASTOLIC BLOOD PRESSURE: 72 MMHG | OXYGEN SATURATION: 98 %

## 2025-04-21 DIAGNOSIS — K86.89 DILATION OF PANCREATIC DUCT (HHS-HCC): Primary | ICD-10-CM

## 2025-04-21 DIAGNOSIS — E11.9 TYPE 2 DIABETES MELLITUS WITHOUT COMPLICATION, WITHOUT LONG-TERM CURRENT USE OF INSULIN: ICD-10-CM

## 2025-04-21 PROCEDURE — 99214 OFFICE O/P EST MOD 30 MIN: CPT

## 2025-04-21 PROCEDURE — 1159F MED LIST DOCD IN RCRD: CPT

## 2025-04-21 PROCEDURE — 3074F SYST BP LT 130 MM HG: CPT

## 2025-04-21 PROCEDURE — 3078F DIAST BP <80 MM HG: CPT

## 2025-04-21 NOTE — PROGRESS NOTES
"Chief Complaint Yuliet Granados is a 74 y.o. female who presents for concern about her blood sugars and follow-up about MRI of the liver    HPI:  Pt states that she has noticed that her blood sugars have been elevated since stopping the pioglitazone and previously her hemoglobin A1c at the last visit came back as 9.5.  Patient was restarted on the pioglitazone however she still continues to see some elevated blood sugars.  Patient is just concerned whether she needs to continue on the pioglitazone or switch to another medication for diabetes.  Patient had MRI done of the liver and was wondering about those results.    ROS:  Review of Systems   Constitutional:  Negative for chills, fatigue and fever.   HENT:  Negative for congestion, ear pain and rhinorrhea.    Eyes:  Negative for visual disturbance.   Respiratory:  Negative for cough, chest tightness, shortness of breath and wheezing.    Cardiovascular:  Negative for chest pain, palpitations and leg swelling.   Gastrointestinal:  Negative for abdominal pain, blood in stool, constipation and diarrhea.   Genitourinary:  Negative for dysuria, frequency, hematuria and urgency.   Musculoskeletal:  Negative for joint swelling.   Skin:  Negative for rash.   Neurological:  Negative for syncope, weakness, numbness and headaches.   Psychiatric/Behavioral:  The patient is not nervous/anxious.        Meds:  Current Medications[1]    Allergies:  RX Allergies[2]    PE:  Visit Vitals  /72   Pulse 70   Ht 1.575 m (5' 2\")   Wt 66.2 kg (146 lb)   SpO2 98%   BMI 26.70 kg/m²   OB Status Postmenopausal   Smoking Status Every Day   BSA 1.7 m²     Physical Exam  Constitutional:       Appearance: Normal appearance.   HENT:      Head: Normocephalic and atraumatic.   Eyes:      Extraocular Movements: Extraocular movements intact.      Pupils: Pupils are equal, round, and reactive to light.   Cardiovascular:      Rate and Rhythm: Normal rate and regular rhythm.   Pulmonary:      Effort: " Pulmonary effort is normal.      Breath sounds: Normal breath sounds.   Abdominal:      General: There is no distension.      Palpations: Abdomen is soft.      Tenderness: There is no abdominal tenderness. There is no guarding or rebound.   Musculoskeletal:         General: Normal range of motion.   Skin:     General: Skin is warm and dry.      Capillary Refill: Capillary refill takes less than 2 seconds.   Neurological:      General: No focal deficit present.      Mental Status: She is alert and oriented to person, place, and time. Mental status is at baseline.   Psychiatric:         Mood and Affect: Mood normal.         Behavior: Behavior normal.         Thought Content: Thought content normal.         Judgment: Judgment normal.           Assessment/Plan  Yuliet Granados is a 74 y.o. female who presents for presents for concern about her blood sugars and follow-up about MRI of the liver.    Discussed with the patient about her blood sugars and her diabetes medication.  Recommended that she continue to monitor her blood sugars and to continue taking the pioglitazone at this time until we get a another hemoglobin A1c in 3 months.  Patient's MRI of the liver showed a dilation of the pancreatic duct with some cyst and there is a concern for neoplasm versus other cause of pancreatic duct dilation.  Patient was referred to gastroenterology for an ERCP as recommended per the MRI results.  Patient agreed with treatment and plan.  Will need to follow-up in 3 months for diabetes follow-up.    Yuliet was seen today for blood sugar problem.  Diagnoses and all orders for this visit:  Dilation of pancreatic duct (HHS-HCC) (Primary)  -     Cancel: Referral to Gastroenterology; Future  -     Referral to Gastroenterology; Future  Type 2 diabetes mellitus without complication, without long-term current use of insulin  -     Hemoglobin A1c; Future         Follow up in...      Patient was staffed with Dr. John Anderson DO,  PGY-3  [facility]       [1]   Current Outpatient Medications:     atenolol (Tenormin) 25 mg tablet, Take 1 tablet (25 mg) by mouth once daily. 12.5mg, Disp: , Rfl:     calcium carbonate 500 mg calcium (1,250 mg) chewable tablet, Chew 1 tablet (1,250 mg) once daily., Disp: , Rfl:     cholecalciferol (Vitamin D3) 25 mcg (1000 units) tablet, Take 1 tablet (1,000 Units) by mouth once daily., Disp: , Rfl:     cranberry 500 mg capsule, Take 500 capsules by mouth once daily., Disp: , Rfl:     fenofibrate (Triglide) 160 mg tablet, Take 1 tablet (160 mg) by mouth once daily., Disp: 90 tablet, Rfl: 0    lisinopril 20 mg tablet, Take 1 tablet (20 mg) by mouth once daily., Disp: 90 tablet, Rfl: 1    omeprazole OTC (PriLOSEC OTC) 20 mg EC tablet, Take 1 tablet (20 mg) by mouth once daily in the morning. Take before meals. Do not crush, chew, or split., Disp: 90 tablet, Rfl: 1    pioglitazone (Actos) 30 mg tablet, Take 1 tablet (30 mg) by mouth once daily., Disp: 90 tablet, Rfl: 0    pravastatin (Pravachol) 20 mg tablet, Take 1 tablet (20 mg) by mouth once daily., Disp: 90 tablet, Rfl: 1    Dexcom G7 Sensor device, Reapply every ten days. (Patient not taking: Reported on 4/2/2025), Disp: 5 each, Rfl: 11  [2]   Allergies  Allergen Reactions    Amoxicillin Nausea And Vomiting    Amoxicillin-Pot Clavulanate Nausea And Vomiting    Doxycycline Nausea And Vomiting    Meperidine Hallucinations

## 2025-04-22 ASSESSMENT — ENCOUNTER SYMPTOMS
HEMATURIA: 0
NUMBNESS: 0
PALPITATIONS: 0
CONSTIPATION: 0
DIARRHEA: 0
WHEEZING: 0
FEVER: 0
CHEST TIGHTNESS: 0
FATIGUE: 0
CHILLS: 0
NERVOUS/ANXIOUS: 0
COUGH: 0
FREQUENCY: 0
RHINORRHEA: 0
ABDOMINAL PAIN: 0
BLOOD IN STOOL: 0
WEAKNESS: 0
JOINT SWELLING: 0
HEADACHES: 0
SHORTNESS OF BREATH: 0
DYSURIA: 0

## 2025-04-22 NOTE — PROGRESS NOTES
I reviewed and examined the patient. I was present for the key exam elements, and I fully participated in the patient's care. I discussed the management of the care with the resident. I have personally reviewed the pertinent labs and imaging, as well as recent notes, with the patient. I have reviewed the note above and agree with the resident's medical decision making as documented in the resident's note, in addition to the following comments / findings:     Agree with the rest of the plan outlined below by resident physician. No red flags.      The patient understands and agrees to the assessment and plan of care. Patient has also agreed to follow up and comply with the treatment and evaluation as recommended today. Patient was instructed to call the office at [phone] should questions arise regarding their treatment or care.     Ko Lopez DO, FAOASM  Family Medicine   [address], [address]  [place] [address]     Ko Lopez DO

## 2025-04-24 ENCOUNTER — DOCUMENTATION (OUTPATIENT)
Dept: GASTROENTEROLOGY | Facility: HOSPITAL | Age: 75
End: 2025-04-24
Payer: MEDICARE

## 2025-04-24 NOTE — PROGRESS NOTES
Dr. Colin Castro's office received a referral from Dr. Ko Lopez for dilation of pancreatic duct. Dr. Castro reviewed the referral on 4/23/2024 and approved proceeding with scheduling the patient for an EUS.    Queenie Angel was notified to call and schedule this patient. Contact Lilo Acosta RN at [phone] for any questions.      Below is supporting clinical information from the referral and the patient's medical records.     Office Visit  Awaiting Cosign  4/21/2025  [facility]      Darling Anderson DO  Family Medicine        Ko Lopez DO  Family Medicine (Cosigner)         Dilation of pancreatic duct (HHS-HCC) +1 more  Dx       Blood Sugar Problem  Reason for Visit    Progress Notes  Darling Anderson DO (Resident)  Family Medicine  Chief Complaint Yuliet Granados is a 74 y.o. female who presents for concern about her blood sugars and follow-up about MRI of the liver    HPI:  Pt states that she has noticed that her blood sugars have been elevated since stopping the pioglitazone and previously her hemoglobin A1c at the last visit came back as 9.5.  Patient was restarted on the pioglitazone however she still continues to see some elevated blood sugars.  Patient is just concerned whether she needs to continue on the pioglitazone or switch to another medication for diabetes.  Patient had MRI done of the liver and was wondering about those results.    ROS:  Review of Systems   Constitutional:  Negative for chills, fatigue and fever.   HENT:  Negative for congestion, ear pain and rhinorrhea.    Eyes:  Negative for visual disturbance.   Respiratory:  Negative for cough, chest tightness, shortness of breath and wheezing.    Cardiovascular:  Negative for chest pain, palpitations and leg swelling.   Gastrointestinal:  Negative for abdominal pain, blood in stool, constipation and diarrhea.   Genitourinary:  Negative for dysuria, frequency, hematuria and urgency.   Musculoskeletal:   Negative for joint swelling.   Skin:  Negative for rash.   Neurological:  Negative for syncope, weakness, numbness and headaches.   Psychiatric/Behavioral:  The patient is not nervous/anxious.           Assessment/Plan  Yuliet Granados is a 74 y.o. female who presents for presents for concern about her blood sugars and follow-up about MRI of the liver.    Discussed with the patient about her blood sugars and her diabetes medication.  Recommended that she continue to monitor her blood sugars and to continue taking the pioglitazone at this time until we get a another hemoglobin A1c in 3 months.  Patient's MRI of the liver showed a dilation of the pancreatic duct with some cyst and there is a concern for neoplasm versus other cause of pancreatic duct dilation.  Patient was referred to gastroenterology for an ERCP as recommended per the MRI results.  Patient agreed with treatment and plan.  Will need to follow-up in 3 months for diabetes follow-up.    Yuliet was seen today for blood sugar problem.  Diagnoses and all orders for this visit:  Dilation of pancreatic duct (HHS-HCC) (Primary)  -     Cancel: Referral to Gastroenterology; Future  -     Referral to Gastroenterology; Future  Type 2 diabetes mellitus without complication, without long-term current use of insulin  -     Hemoglobin A1c; Future      MR liver w and wo IV contrast  Status: Final result    PACS Images    Show images for MR liver w and wo IV contrast  Signed by    Signed            Time   Phone Pager  De Mckinley MD            4/18/2025 09:50    [phone]             Exam Information    Status Exam Begun Exam Ended  Final   4/16/2025 08:12    4/16/2025 08:56    Study Result    Narrative & Impression  Interpreted By:  De Mckinley,   STUDY:  MR LIVER W AND WO IV CONTRAST;  4/16/2025 8:56 am      INDICATION:  Signs/Symptoms:abnormal liver ultrasound.      ,R93.89 Abnormal findings on diagnostic imaging of other specified  body structures       COMPARISON:  CT scan 04/05/2013      ACCESSION NUMBER(S):  [id number]      ORDERING CLINICIAN:  ERICA VANCE      TECHNIQUE:  MRI LIVER; Multiplanar magnetic resonance images of the abdomen were  obtained including the following sequences; T2-weighted SSFSE with  and without fat saturation, T1-weighted GRE in/opposed phase, DWI,  fat saturated 3D-T1w GRE pre and dynamically post contrast.  13 ML of  Dotarem was administered intravenously without immediate complication.      FINDINGS:  LIVER:  Normal size. Normal contour. Steatosis.      Small 0.5 cm predominantly arterial enhancing focus in segment 4B  (series 13, image 29) that appears isoenhancing in the later  postcontrast phases with apparent signal alteration in  diffusion-weighted image (series 33, image 87).      Another subtle focus in segment 5/6 measuring 0.4 cm (series 13,  image 25) with no definite signal alteration in other sequences  possibly vascular shunt.      BILE DUCTS:  No biliary dilatation      GALLBLADDER:  Unremarkable      PANCREAS:  Two adjacent elongated small cystic foci in the pancreatic body the  proximal measuring 0.9 x 0.6 cm in the distal measuring 0.4 x 0.3 cm  (series 26, image 16) likely communicating with the adjacent  pancreatic duct and possibly representing focal pancreatic duct  dilatation without measurable masses in the level of the caliber  change could be visualized. Atrophic changes of the distal pancreatic  tail. Asymmetric distal pancreatic body enhancement (series 17, image  30)      SPLEEN:  No splenomegaly.      ADRENAL GLANDS:  No nodule      KIDNEYS:  No hydronephrosis. Small right lower pole simple cyst measuring 1 cm      LYMPH NODES:  No enlarged lymph nodes      ABDOMINAL VESSELS:  No aneurysmal dilatation of the abdominal aorta.      BOWEL:  No bowel dilatation. Small posterior gastric diverticulum measuring  2.3 cm.      PERITONEUM/RETROPERITONEUM:  No ascites      BONES AND LOWER THORAX:  Multilevel  degenerative spine changes. The visualized lower lung  fields are unremarkable. The heart is normal in size.      IMPRESSION:  1. Hepatic steatosis with 0.5 cm arterial enhancing focus in segment  4B with no definite signal alteration in T2 weighted image could  small FNH or adenoma which would warrant follow-up in 6-12 months to  ensure stability. No overt findings of cirrhosis.  2. Prominent pancreatic body elongated cystic foci measuring up to  0.9 cm likely representing focal pancreatic duct dilatation with more  proximal pancreatic tail atrophy and asymmetric distal pancreatic  body enhancement without discrete measurable obstructive pathology  could be visualized. Findings could be related to chronic  pancreatitis with focal stricture downstream to the aforementioned  findings (to note, chest CT scan dated 10/14/2024 shows tiny  pancreatic body calcific focus unclear if represent intraductal  calculus responsible for the upstream duct dilatation). Absence of  MRCP sequence precludes evaluation. To note, the pancreatic duct  dilatation is new from prior CT scan dated 04/05/2013      Recommendation:  Advise hepatobiliary surgery/advanced gastroenterology referral and  accordingly ERCP/EUS could be considered for further assessment of  the pancreatic findings and screen for occult obstructive  pathology/neoplasm at the level of the pancreatic duct caliber change.      Significant results of the study were relayed by me to Ko Lopez DO  on 04/18/2025 at 9:40 a.m. through [employer] secure chat.      MACRO:  Critical Finding:  See findings. Notification was initiated on  4/18/2025 at 9:43 am by  De Mckinley.  (**-YCF-**) Instructions:      Signed by: De Mckinley 4/18/2025 9:50 AM  Dictation workstation:   [id number]    Result History    MR liver w and wo IV contrast (Order #[id number]) on 4/18/2025 - Order Result History Report      MR liver w and wo IV contrast: Result Notes                Ko Lopez  DO  2025  1:43 PM EDT        BILIRUBIN, TOTAL  0.2 - 1.2 mg/dL        0.5  ALKALINE PHOSPHATASE  37 - 153 U/L 77  AST  10 - 35 U/L   14  ALT  6 - 29 U/L      17  Resulting Agency    [facility]  Specimen Collected: 25 10:09      Surgical History  She has a past surgical history that includes Hysterectomy (); Other surgical history (10/04/2019); Other surgical history (10/04/2019); CT angio aorta and bilateral iliofemoral runoff including without contrast if performed (2023); Breast biopsy (Left); Appendectomy ();  section, low transverse (); and Breast cyst aspiration ().        History of Present Illness  1.  Potential NAFLD  She made an appointment for the Liver MRI on the , but will reschedule it due to her colonoscopy on .   RUQ US showed: Heterogenous hepatic echotexture with slight contour irregularity could be related to underlying diffuse hepatocellular dysfunction/steatosis without gross lesions. Gallbladder polyp measuring 0.4 cm.      2. Diabetes mellitus  Last HbA1c on 24: 6.8%, stable from prior.   Yuliet was told to come in and go over everything regarding her labs, and she did not do fasting for this, she ate breakfast before she went. She has fasted today and her glucose was rechecked in office, her A1c is 9.5%. She endorses that she eats more carbohydrates. She refuses to take metformin, based on her research and she cannot afford Jardiance, so would not be willing to take it at this time.      She was previously on pioglitazone 30 mg which she tolerated well. She was taken off of the medication by a previous provider. Yuliet would rather resume pioglitazone then starting a new medication with metformin or Farxiga.    3. GERD  She takes omeprazole as prescribed and notices that it does not hurt as much and is helping. She is also avoiding acidic and spicy foods to help with the management of the  reflux.    4. Hypertension  Takes atenolol 25 mg daily. Tolerates well and denies chest pain, SOB and ACS symptoms.    Review of Systems  All pertinent positive symptoms are included in the history of present illness.    All other systems have been reviewed and are negative and noncontributory to this patient's current ailments.    Past Medical History  She has a past medical history of Breast cyst (1990s), Diabetes mellitus (Multi), Personal history of other diseases of the circulatory system, Personal history of other endocrine, nutritional and metabolic disease, Personal history of other endocrine, nutritional and metabolic disease, Personal history of other malignant neoplasm of skin, and Thyroid nodule (2021).

## 2025-05-23 ENCOUNTER — APPOINTMENT (OUTPATIENT)
Dept: PRIMARY CARE | Facility: CLINIC | Age: 75
End: 2025-05-23
Payer: MEDICARE

## 2025-06-05 ENCOUNTER — APPOINTMENT (OUTPATIENT)
Dept: CARDIOLOGY | Facility: HOSPITAL | Age: 75
End: 2025-06-05
Payer: MEDICARE

## 2025-06-05 ENCOUNTER — HOSPITAL ENCOUNTER (EMERGENCY)
Facility: HOSPITAL | Age: 75
Discharge: HOME | End: 2025-06-05
Attending: STUDENT IN AN ORGANIZED HEALTH CARE EDUCATION/TRAINING PROGRAM
Payer: MEDICARE

## 2025-06-05 ENCOUNTER — APPOINTMENT (OUTPATIENT)
Dept: RADIOLOGY | Facility: HOSPITAL | Age: 75
End: 2025-06-05
Payer: MEDICARE

## 2025-06-05 VITALS
WEIGHT: 146 LBS | HEART RATE: 53 BPM | OXYGEN SATURATION: 100 % | RESPIRATION RATE: 17 BRPM | DIASTOLIC BLOOD PRESSURE: 57 MMHG | HEIGHT: 62 IN | BODY MASS INDEX: 26.87 KG/M2 | TEMPERATURE: 97.2 F | SYSTOLIC BLOOD PRESSURE: 119 MMHG

## 2025-06-05 DIAGNOSIS — R42 LIGHTHEADEDNESS: Primary | ICD-10-CM

## 2025-06-05 DIAGNOSIS — N17.9 AKI (ACUTE KIDNEY INJURY): ICD-10-CM

## 2025-06-05 LAB
ALBUMIN SERPL BCP-MCNC: 4.4 G/DL (ref 3.4–5)
ALP SERPL-CCNC: 56 U/L (ref 33–136)
ALT SERPL W P-5'-P-CCNC: 14 U/L (ref 7–45)
ANION GAP BLDV CALCULATED.4IONS-SCNC: 13 MMOL/L (ref 10–25)
ANION GAP SERPL CALC-SCNC: 15 MMOL/L (ref 10–20)
APPEARANCE UR: CLEAR
AST SERPL W P-5'-P-CCNC: 21 U/L (ref 9–39)
BASE EXCESS BLDV CALC-SCNC: -0.7 MMOL/L (ref -2–3)
BASOPHILS # BLD AUTO: 0.08 X10*3/UL (ref 0–0.1)
BASOPHILS NFR BLD AUTO: 0.9 %
BILIRUB SERPL-MCNC: 0.5 MG/DL (ref 0–1.2)
BILIRUB UR STRIP.AUTO-MCNC: NEGATIVE MG/DL
BODY TEMPERATURE: ABNORMAL
BUN SERPL-MCNC: 31 MG/DL (ref 6–23)
CA-I BLDV-SCNC: 1.36 MMOL/L (ref 1.1–1.33)
CALCIUM SERPL-MCNC: 10.3 MG/DL (ref 8.6–10.3)
CARDIAC TROPONIN I PNL SERPL HS: 3 NG/L (ref 0–13)
CHLORIDE BLDV-SCNC: 103 MMOL/L (ref 98–107)
CHLORIDE SERPL-SCNC: 101 MMOL/L (ref 98–107)
CO2 SERPL-SCNC: 25 MMOL/L (ref 21–32)
COLOR UR: ABNORMAL
CREAT SERPL-MCNC: 1.05 MG/DL (ref 0.5–1.05)
D DIMER PPP FEU-MCNC: 352 NG/ML FEU
EGFRCR SERPLBLD CKD-EPI 2021: 55 ML/MIN/1.73M*2
EOSINOPHIL # BLD AUTO: 0.08 X10*3/UL (ref 0–0.4)
EOSINOPHIL NFR BLD AUTO: 0.9 %
ERYTHROCYTE [DISTWIDTH] IN BLOOD BY AUTOMATED COUNT: 17 % (ref 11.5–14.5)
FLUAV RNA RESP QL NAA+PROBE: NOT DETECTED
FLUBV RNA RESP QL NAA+PROBE: NOT DETECTED
GLUCOSE BLDV-MCNC: 141 MG/DL (ref 74–99)
GLUCOSE SERPL-MCNC: 169 MG/DL (ref 74–99)
GLUCOSE UR STRIP.AUTO-MCNC: NORMAL MG/DL
HCO3 BLDV-SCNC: 24.2 MMOL/L (ref 22–26)
HCT VFR BLD AUTO: 44 % (ref 36–46)
HCT VFR BLD EST: 44 % (ref 36–46)
HGB BLD-MCNC: 15.1 G/DL (ref 12–16)
HGB BLDV-MCNC: 14.7 G/DL (ref 12–16)
IMM GRANULOCYTES # BLD AUTO: 0.02 X10*3/UL (ref 0–0.5)
IMM GRANULOCYTES NFR BLD AUTO: 0.2 % (ref 0–0.9)
INHALED O2 CONCENTRATION: 0 %
KETONES UR STRIP.AUTO-MCNC: NEGATIVE MG/DL
LACTATE BLDV-SCNC: 1.1 MMOL/L (ref 0.4–2)
LEUKOCYTE ESTERASE UR QL STRIP.AUTO: ABNORMAL
LYMPHOCYTES # BLD AUTO: 2.76 X10*3/UL (ref 0.8–3)
LYMPHOCYTES NFR BLD AUTO: 29.3 %
MCH RBC QN AUTO: 29.3 PG (ref 26–34)
MCHC RBC AUTO-ENTMCNC: 34.3 G/DL (ref 32–36)
MCV RBC AUTO: 85 FL (ref 80–100)
MONOCYTES # BLD AUTO: 0.8 X10*3/UL (ref 0.05–0.8)
MONOCYTES NFR BLD AUTO: 8.5 %
NEUTROPHILS # BLD AUTO: 5.67 X10*3/UL (ref 1.6–5.5)
NEUTROPHILS NFR BLD AUTO: 60.2 %
NITRITE UR QL STRIP.AUTO: NEGATIVE
NRBC BLD-RTO: 0 /100 WBCS (ref 0–0)
OXYHGB MFR BLDV: 66.4 % (ref 45–75)
PCO2 BLDV: 40 MM HG (ref 41–51)
PH BLDV: 7.39 PH (ref 7.33–7.43)
PH UR STRIP.AUTO: 5.5 [PH]
PLATELET # BLD AUTO: 354 X10*3/UL (ref 150–450)
PO2 BLDV: 40 MM HG (ref 35–45)
POTASSIUM BLDV-SCNC: 4.3 MMOL/L (ref 3.5–5.3)
POTASSIUM SERPL-SCNC: 4.4 MMOL/L (ref 3.5–5.3)
PROT SERPL-MCNC: 7.1 G/DL (ref 6.4–8.2)
PROT UR STRIP.AUTO-MCNC: NEGATIVE MG/DL
RBC # BLD AUTO: 5.15 X10*6/UL (ref 4–5.2)
RBC # UR STRIP.AUTO: NEGATIVE MG/DL
RBC #/AREA URNS AUTO: ABNORMAL /HPF
RSV RNA RESP QL NAA+PROBE: NOT DETECTED
SAO2 % BLDV: 70 % (ref 45–75)
SARS-COV-2 RNA RESP QL NAA+PROBE: NOT DETECTED
SODIUM BLDV-SCNC: 136 MMOL/L (ref 136–145)
SODIUM SERPL-SCNC: 137 MMOL/L (ref 136–145)
SP GR UR STRIP.AUTO: 1.02
SQUAMOUS #/AREA URNS AUTO: ABNORMAL /HPF
UROBILINOGEN UR STRIP.AUTO-MCNC: NORMAL MG/DL
WBC # BLD AUTO: 9.4 X10*3/UL (ref 4.4–11.3)
WBC #/AREA URNS AUTO: ABNORMAL /HPF

## 2025-06-05 PROCEDURE — 87637 SARSCOV2&INF A&B&RSV AMP PRB: CPT | Performed by: STUDENT IN AN ORGANIZED HEALTH CARE EDUCATION/TRAINING PROGRAM

## 2025-06-05 PROCEDURE — 36415 COLL VENOUS BLD VENIPUNCTURE: CPT | Performed by: STUDENT IN AN ORGANIZED HEALTH CARE EDUCATION/TRAINING PROGRAM

## 2025-06-05 PROCEDURE — 82805 BLOOD GASES W/O2 SATURATION: CPT | Performed by: STUDENT IN AN ORGANIZED HEALTH CARE EDUCATION/TRAINING PROGRAM

## 2025-06-05 PROCEDURE — 87086 URINE CULTURE/COLONY COUNT: CPT | Mod: GEALAB | Performed by: STUDENT IN AN ORGANIZED HEALTH CARE EDUCATION/TRAINING PROGRAM

## 2025-06-05 PROCEDURE — 71046 X-RAY EXAM CHEST 2 VIEWS: CPT | Performed by: RADIOLOGY

## 2025-06-05 PROCEDURE — 85025 COMPLETE CBC W/AUTO DIFF WBC: CPT | Performed by: STUDENT IN AN ORGANIZED HEALTH CARE EDUCATION/TRAINING PROGRAM

## 2025-06-05 PROCEDURE — 2500000004 HC RX 250 GENERAL PHARMACY W/ HCPCS (ALT 636 FOR OP/ED): Performed by: STUDENT IN AN ORGANIZED HEALTH CARE EDUCATION/TRAINING PROGRAM

## 2025-06-05 PROCEDURE — 84132 ASSAY OF SERUM POTASSIUM: CPT | Performed by: STUDENT IN AN ORGANIZED HEALTH CARE EDUCATION/TRAINING PROGRAM

## 2025-06-05 PROCEDURE — 99285 EMERGENCY DEPT VISIT HI MDM: CPT | Mod: 25 | Performed by: STUDENT IN AN ORGANIZED HEALTH CARE EDUCATION/TRAINING PROGRAM

## 2025-06-05 PROCEDURE — 81001 URINALYSIS AUTO W/SCOPE: CPT | Performed by: STUDENT IN AN ORGANIZED HEALTH CARE EDUCATION/TRAINING PROGRAM

## 2025-06-05 PROCEDURE — 71046 X-RAY EXAM CHEST 2 VIEWS: CPT

## 2025-06-05 PROCEDURE — 70450 CT HEAD/BRAIN W/O DYE: CPT | Performed by: RADIOLOGY

## 2025-06-05 PROCEDURE — 93005 ELECTROCARDIOGRAM TRACING: CPT

## 2025-06-05 PROCEDURE — 70450 CT HEAD/BRAIN W/O DYE: CPT

## 2025-06-05 PROCEDURE — 96360 HYDRATION IV INFUSION INIT: CPT

## 2025-06-05 PROCEDURE — 82435 ASSAY OF BLOOD CHLORIDE: CPT | Performed by: STUDENT IN AN ORGANIZED HEALTH CARE EDUCATION/TRAINING PROGRAM

## 2025-06-05 PROCEDURE — 85379 FIBRIN DEGRADATION QUANT: CPT | Performed by: STUDENT IN AN ORGANIZED HEALTH CARE EDUCATION/TRAINING PROGRAM

## 2025-06-05 PROCEDURE — 84484 ASSAY OF TROPONIN QUANT: CPT | Performed by: STUDENT IN AN ORGANIZED HEALTH CARE EDUCATION/TRAINING PROGRAM

## 2025-06-05 RX ADMIN — SODIUM CHLORIDE, POTASSIUM CHLORIDE, SODIUM LACTATE AND CALCIUM CHLORIDE 1000 ML: 600; 310; 30; 20 INJECTION, SOLUTION INTRAVENOUS at 15:24

## 2025-06-05 ASSESSMENT — COLUMBIA-SUICIDE SEVERITY RATING SCALE - C-SSRS
1. IN THE PAST MONTH, HAVE YOU WISHED YOU WERE DEAD OR WISHED YOU COULD GO TO SLEEP AND NOT WAKE UP?: NO
2. HAVE YOU ACTUALLY HAD ANY THOUGHTS OF KILLING YOURSELF?: NO
6. HAVE YOU EVER DONE ANYTHING, STARTED TO DO ANYTHING, OR PREPARED TO DO ANYTHING TO END YOUR LIFE?: NO

## 2025-06-05 ASSESSMENT — LIFESTYLE VARIABLES
TOTAL SCORE: 0
HAVE PEOPLE ANNOYED YOU BY CRITICIZING YOUR DRINKING: NO
EVER HAD A DRINK FIRST THING IN THE MORNING TO STEADY YOUR NERVES TO GET RID OF A HANGOVER: NO
EVER FELT BAD OR GUILTY ABOUT YOUR DRINKING: NO
HAVE YOU EVER FELT YOU SHOULD CUT DOWN ON YOUR DRINKING: NO

## 2025-06-05 ASSESSMENT — PAIN - FUNCTIONAL ASSESSMENT: PAIN_FUNCTIONAL_ASSESSMENT: 0-10

## 2025-06-05 ASSESSMENT — PAIN SCALES - GENERAL: PAINLEVEL_OUTOF10: 0 - NO PAIN

## 2025-06-05 NOTE — ED TRIAGE NOTES
Pt BIBS from home with c/o intermittent lightheadedness. Began 2 days ago. Pt states her BP has also been running low (110/50's) States she was seen last year for similar sx and they decided to reduce her atenolol by half. Denies shortness of breath, CP, dark stools. No changes to oral intake.

## 2025-06-05 NOTE — ED PROVIDER NOTES
CC: Dizziness     HPI:  Patient is a 75-year-old female with a history of non-insulin-dependent diabetes, hypertension and hyperlipidemia who presents to the emergency department for lightheadedness.  She describes it as [place] in her brain.  She denies dizziness like room spinning sensation.  Patient denies chest pain or abdominal pain.  She denies shortness of breath.  Her daughters are at bedside states that she does not drink a significant amount of water and they are worried she may be dehydrated.  Patient denies sick symptoms including fevers chills cough cold congestion or runny nose however family states that she did feel warm when they went and saw her today.    Records Reviewed:  Recent available ED and inpatient notes reviewed in EMR.    PMHx/PSHx:  Per HPI.   - has a past medical history of Breast cyst, Diabetes mellitus (Multi), Personal history of other diseases of the circulatory system, Personal history of other endocrine, nutritional and metabolic disease, Personal history of other endocrine, nutritional and metabolic disease, Personal history of other malignant neoplasm of skin, and Thyroid nodule.  - has a past surgical history that includes Hysterectomy (); Other surgical history (10/04/2019); Other surgical history (10/04/2019); CT angio aorta and bilateral iliofemoral runoff including without contrast if performed (2023); Breast biopsy (Left); Appendectomy ();  section, low transverse (); and Breast cyst aspiration ().  - has Chronic radicular lumbar pain; Lumbar post-laminectomy syndrome; Lumbar radiculitis; Lumbosacral radiculopathy due to degenerative joint disease of spine; Myofascial pain; Osteopenia; Sciatica; Thyroid nodule; Fibrocystic breast changes; History of diabetes mellitus; History of elevated lipids; History of hypertension; History of malignant neoplasm of skin; HENLEY (nonalcoholic steatohepatitis); Hypertension; and Hyperlipidemia on their  problem list.    Medications:  Reviewed in EMR. See EMR for complete list of medications and doses.    Allergies:  Amoxicillin, Amoxicillin-pot clavulanate, Doxycycline, and Meperidine    Social History:  - Tobacco:  reports that she has been smoking cigarettes. She has never used smokeless tobacco.   - Alcohol:  reports no history of alcohol use.   - Illicit Drugs:  reports no history of drug use.     ROS:  Per HPI.       ???????????????????????????????????????????????????????????????  Triage Vitals:  T 36.2 °C (97.2 °F)  HR 55  /52  RR 18  O2 97 % None (Room air)    Physical Exam  ???????????????????????????????????????????????????????????????  GEN: well appearing, no acute distress  HEAD: atraumatic  EYES: PERRL, EOMI  NECK: no JVD  CVS/CHEST: reg rate, nl rhythm  PULM: CTA b/l no wheezes, crackles, or rhonchi   GI: soft, NT/ND, no rebound or guarding   BACK: no CVA tenderness, no vertebral point tenderness  EXT: no LE edema  NEURO: NIH of 0.  Answering questions appropriately.  No peripheral field cut.  Extraocular muscles intact.  No drift.  No ataxia.  Normal gait.  Awake and alert, Strength and sensation is equal in b/l upper and lower extremities, normal ambulation  SKIN: warm, dry  PSYCH: AAOx3 answers questions appropriately    EKG:  As interpreted by me sinus bradycardia 50 bpm.  Normal axis.  Normal intervals.  No ST segment elevation or depression.    Assessment and Plan:  75-year-old female presents to the emergency department with lightheadedness.  Her NIH is 0.  She was endorsing a mild headache therefore CT obtained.  I have a low suspicion for subarachnoid hemorrhage as she describes as a light dull headache.  She describes her dizziness as [place] in the brain.  Again she has a nonfocal neurologic deficit with no vision deficit.  She has no fever here.  Her neck is supple.  Low suspicion for meningitis.  Workup in the emergency department including D-dimer reassuring.  D-dimer was  obtained given that she describes it more of a lightheadedness than a vertiginous symptom.  Results that show an VERONICA above her baseline.  Given a liter of IV fluids and asymptomatic on reevaluation.  Reached out to her primary care doctor given her slight uremia and encouraged repeat blood work next week..  Patient reevaluated she has no hematochezia or melena.  Is urinating without difficulty.  Will obtain a urine sample for outpatient follow-up as she has no urinary symptoms.  Given strict return precautions.  Discharged in stable condition.    ED Course:  ED Course as of 06/05/25 1627   Thu Jun 05, 2025   1540 D-Dimer, Quantitative VTE Exclusion: 352 [HD]   1540 IMPRESSION:  NO ACUTE INTRACRANIAL PROCESS       [HD]   1540 IMPRESSION:  Emphysematous changes in the lungs. No evidence of acute  cardiopulmonary process.   [HD]   1619 VERONICA [HD]      ED Course User Index  [HD] Marion Cummings DO         Diagnoses as of 06/05/25 1627   Lightheadedness   VERONICA (acute kidney injury)       Disposition:  Discharged in stable condition with return precautions    Marion Cummings DO      Procedures ? [employer] last updated 6/5/2025 4:27 PM        Marion Cummings DO  06/05/25 1636

## 2025-06-06 ENCOUNTER — TELEPHONE (OUTPATIENT)
Facility: CLINIC | Age: 75
End: 2025-06-06
Payer: MEDICARE

## 2025-06-06 LAB — HOLD SPECIMEN: NORMAL

## 2025-06-06 NOTE — TELEPHONE ENCOUNTER
Patient went to the ER yesterday. Was feeling a little lightheaded  Her blood pressure was 96/4?  They did a bunch of testing on her.  Doctor told her that her kidney function was a little low.  Told her to check in with you and lab needs repeated for kidney function.  She is on blood pressure meds.  Possible dehydration.  Feels a little better today.  Still having a dull headache.

## 2025-06-07 LAB — BACTERIA UR CULT: NORMAL

## 2025-06-12 ENCOUNTER — APPOINTMENT (OUTPATIENT)
Facility: CLINIC | Age: 75
End: 2025-06-12
Payer: MEDICARE

## 2025-06-12 VITALS
BODY MASS INDEX: 26.68 KG/M2 | WEIGHT: 145 LBS | OXYGEN SATURATION: 95 % | DIASTOLIC BLOOD PRESSURE: 86 MMHG | HEIGHT: 62 IN | SYSTOLIC BLOOD PRESSURE: 154 MMHG | HEART RATE: 84 BPM

## 2025-06-12 DIAGNOSIS — R42 DIZZINESS, NONSPECIFIC: ICD-10-CM

## 2025-06-12 DIAGNOSIS — E11.9 TYPE 2 DIABETES MELLITUS WITHOUT COMPLICATION, WITHOUT LONG-TERM CURRENT USE OF INSULIN: Primary | ICD-10-CM

## 2025-06-12 DIAGNOSIS — Z13.220 SCREENING FOR HYPERLIPIDEMIA: ICD-10-CM

## 2025-06-12 LAB — POC HEMOGLOBIN A1C: 8.8 % (ref 4.2–6.5)

## 2025-06-12 PROCEDURE — 3079F DIAST BP 80-89 MM HG: CPT

## 2025-06-12 PROCEDURE — 83036 HEMOGLOBIN GLYCOSYLATED A1C: CPT

## 2025-06-12 PROCEDURE — 3077F SYST BP >= 140 MM HG: CPT

## 2025-06-12 PROCEDURE — 99214 OFFICE O/P EST MOD 30 MIN: CPT

## 2025-06-12 PROCEDURE — 1159F MED LIST DOCD IN RCRD: CPT

## 2025-06-12 ASSESSMENT — ENCOUNTER SYMPTOMS
JOINT SWELLING: 0
WEAKNESS: 0
COUGH: 0
DIARRHEA: 0
CONSTIPATION: 0
WHEEZING: 0
SHORTNESS OF BREATH: 0
CHILLS: 0
PALPITATIONS: 0
FATIGUE: 0
HEADACHES: 0
NERVOUS/ANXIOUS: 0
FREQUENCY: 0
ABDOMINAL PAIN: 0
NUMBNESS: 0
FEVER: 0
RHINORRHEA: 0
DYSURIA: 0
CHEST TIGHTNESS: 0
HEMATURIA: 0
BLOOD IN STOOL: 0

## 2025-06-12 NOTE — PATIENT INSTRUCTIONS
Recommend to continue the lisinopril at 10 mg daily. Recommended to ask cardiologist about potentially stopping your atenolol. Continue your pioglitazone for you diabetes. We will follow up with your lab work at your next visit. Please call the office if you have question before your next visit.

## 2025-06-12 NOTE — PROGRESS NOTES
"Chief Complaint Yuliet Granados is a 75 y.o. female who presents for follow up for diabetes and ER follow up for dizziness.     HPI:  Pt states that she has noticed that her blood sugars have been averaging 120s for the last three weeks. Will continue current regimen. Went to the ER because she felt off. Recommended to follow up about kidney function. Lisinopril dose cut in half.  Has appointment with Dr. Terrazas for EUS.    ROS:  Review of Systems   Constitutional:  Negative for chills, fatigue and fever.   HENT:  Negative for congestion, ear pain and rhinorrhea.    Eyes:  Negative for visual disturbance.   Respiratory:  Negative for cough, chest tightness, shortness of breath and wheezing.    Cardiovascular:  Negative for chest pain, palpitations and leg swelling.   Gastrointestinal:  Negative for abdominal pain, blood in stool, constipation and diarrhea.   Genitourinary:  Negative for dysuria, frequency, hematuria and urgency.   Musculoskeletal:  Negative for joint swelling.   Skin:  Negative for rash.   Neurological:  Negative for syncope, weakness, numbness and headaches.   Psychiatric/Behavioral:  The patient is not nervous/anxious.        Meds:  Current Medications[1]    Allergies:  RX Allergies[2]    PE:  Visit Vitals  /86   Pulse 84   Ht 1.575 m (5' 2\")   Wt 65.8 kg (145 lb)   SpO2 95%   BMI 26.52 kg/m²   OB Status Postmenopausal   Smoking Status Every Day   BSA 1.7 m²     Physical Exam  Constitutional:       Appearance: Normal appearance.   HENT:      Head: Normocephalic and atraumatic.   Eyes:      Extraocular Movements: Extraocular movements intact.      Pupils: Pupils are equal, round, and reactive to light.   Cardiovascular:      Rate and Rhythm: Normal rate and regular rhythm.   Pulmonary:      Effort: Pulmonary effort is normal.      Breath sounds: Normal breath sounds.   Abdominal:      General: There is no distension.      Palpations: Abdomen is soft.      Tenderness: There is no abdominal " tenderness. There is no guarding or rebound.   Musculoskeletal:         General: Normal range of motion.   Skin:     General: Skin is warm and dry.      Capillary Refill: Capillary refill takes less than 2 seconds.   Neurological:      General: No focal deficit present.      Mental Status: She is alert and oriented to person, place, and time. Mental status is at baseline.   Psychiatric:         Mood and Affect: Mood normal.         Behavior: Behavior normal.         Thought Content: Thought content normal.         Judgment: Judgment normal.         Assessment/Plan  Yuliet Granados is a 75 y.o. female who presents for presents for follow up for diabetes and ER follow up for dizziness. Hgb A1C decreased from 9.5 to 8.8. Will follow up with kidney function and general lab ordrers. Will continue with lisinopril at 10 mg. Recommended to for patient to ask their cardiologist about the atenolol and see if that is something they can discontinue to side effects. Patient agreed with treatment and plan. Will need to follow up in October for annual physical and diabetes.        Yuliet was seen today for follow-up.  Diagnoses and all orders for this visit:  Type 2 diabetes mellitus without complication, without long-term current use of insulin (Primary)  -     POCT glycosylated hemoglobin (Hb A1C) manually resulted  Screening for hyperlipidemia  -     Lipid Panel; Future  -     Lipid Panel           Follow up for annual physical in October      Patient was staffed with Dr. John Anderson DO, PGY-3  [facility]         [1]   Current Outpatient Medications:     atenolol (Tenormin) 25 mg tablet, Take 1 tablet (25 mg) by mouth once daily. 12.5mg, Disp: , Rfl:     calcium carbonate 500 mg calcium (1,250 mg) chewable tablet, Chew 1 tablet (500 mg of elemental calcium) once daily., Disp: , Rfl:     cholecalciferol (Vitamin D3) 25 mcg (1000 units) tablet, Take 1 tablet (1,000 Units) by mouth once daily., Disp: ,  Rfl:     cranberry 500 mg capsule, Take 500 capsules by mouth once daily., Disp: , Rfl:     fenofibrate (Triglide) 160 mg tablet, Take 1 tablet (160 mg) by mouth once daily., Disp: 90 tablet, Rfl: 0    lisinopril 20 mg tablet, Take 1 tablet (20 mg) by mouth once daily. (Patient taking differently: Take 0.5 tablets (10 mg) by mouth once daily.), Disp: 90 tablet, Rfl: 1    omeprazole OTC (PriLOSEC OTC) 20 mg EC tablet, Take 1 tablet (20 mg) by mouth once daily in the morning. Take before meals. Do not crush, chew, or split., Disp: 90 tablet, Rfl: 1    pioglitazone (Actos) 30 mg tablet, Take 1 tablet (30 mg) by mouth once daily., Disp: 90 tablet, Rfl: 0    pravastatin (Pravachol) 20 mg tablet, Take 1 tablet (20 mg) by mouth once daily., Disp: 90 tablet, Rfl: 1  [2]   Allergies  Allergen Reactions    Amoxicillin Nausea And Vomiting    Amoxicillin-Pot Clavulanate Nausea And Vomiting    Doxycycline Nausea And Vomiting    Meperidine Hallucinations

## 2025-06-13 NOTE — PROGRESS NOTES
I reviewed and examined the patient. I was present for the key exam elements, and I fully participated in the patient's care. I discussed the management of the care with the resident. I have personally reviewed the pertinent labs and imaging, as well as recent notes, with the patient. I have reviewed the note above and agree with the resident's medical decision making as documented in the resident's note, in addition to the following comments / findings:     Agree with the rest of the plan outlined below by resident physician. No red flags.      The patient understands and agrees to the assessment and plan of care. Patient has also agreed to follow up and comply with the treatment and evaluation as recommended today. Patient was instructed to call the office at [phone] should questions arise regarding their treatment or care.     Ko Lopez DO, FAOASM  Family Medicine   [address], [address]  [place] [address]     Ko Lopez DO

## 2025-06-14 LAB
ALBUMIN SERPL-MCNC: 4.3 G/DL (ref 3.6–5.1)
ALP SERPL-CCNC: 53 U/L (ref 37–153)
ALT SERPL-CCNC: 12 U/L (ref 6–29)
ANION GAP SERPL CALCULATED.4IONS-SCNC: 8 MMOL/L (CALC) (ref 7–17)
AST SERPL-CCNC: 14 U/L (ref 10–35)
BILIRUB SERPL-MCNC: 0.4 MG/DL (ref 0.2–1.2)
BUN SERPL-MCNC: 21 MG/DL (ref 7–25)
CALCIUM SERPL-MCNC: 9.7 MG/DL (ref 8.6–10.4)
CHLORIDE SERPL-SCNC: 106 MMOL/L (ref 98–110)
CHOLEST SERPL-MCNC: 166 MG/DL
CHOLEST/HDLC SERPL: 3.1 (CALC)
CO2 SERPL-SCNC: 25 MMOL/L (ref 20–32)
CREAT SERPL-MCNC: 0.73 MG/DL (ref 0.6–1)
EGFRCR SERPLBLD CKD-EPI 2021: 85 ML/MIN/1.73M2
ERYTHROCYTE [DISTWIDTH] IN BLOOD BY AUTOMATED COUNT: 16.3 % (ref 11–15)
EST. AVERAGE GLUCOSE BLD GHB EST-MCNC: 226 MG/DL
EST. AVERAGE GLUCOSE BLD GHB EST-SCNC: 12.5 MMOL/L
GLUCOSE SERPL-MCNC: 158 MG/DL (ref 65–99)
HBA1C MFR BLD: 9.5 %
HCT VFR BLD AUTO: 45.2 % (ref 35–45)
HDLC SERPL-MCNC: 53 MG/DL
HGB BLD-MCNC: 14.4 G/DL (ref 11.7–15.5)
LDLC SERPL CALC-MCNC: 94 MG/DL (CALC)
MCH RBC QN AUTO: 28.5 PG (ref 27–33)
MCHC RBC AUTO-ENTMCNC: 31.9 G/DL (ref 32–36)
MCV RBC AUTO: 89.5 FL (ref 80–100)
NONHDLC SERPL-MCNC: 113 MG/DL (CALC)
PLATELET # BLD AUTO: 319 THOUSAND/UL (ref 140–400)
PMV BLD REES-ECKER: 11.8 FL (ref 7.5–12.5)
POTASSIUM SERPL-SCNC: 4.2 MMOL/L (ref 3.5–5.3)
PROT SERPL-MCNC: 6.5 G/DL (ref 6.1–8.1)
RBC # BLD AUTO: 5.05 MILLION/UL (ref 3.8–5.1)
SODIUM SERPL-SCNC: 139 MMOL/L (ref 135–146)
TRIGL SERPL-MCNC: 99 MG/DL
WBC # BLD AUTO: 6.8 THOUSAND/UL (ref 3.8–10.8)

## 2025-06-26 LAB
ATRIAL RATE: 50 BPM
P AXIS: 60 DEGREES
P OFFSET: 204 MS
P ONSET: 143 MS
PR INTERVAL: 154 MS
Q ONSET: 220 MS
QRS COUNT: 8 BEATS
QRS DURATION: 86 MS
QT INTERVAL: 438 MS
QTC CALCULATION(BAZETT): 399 MS
QTC FREDERICIA: 412 MS
R AXIS: 69 DEGREES
T AXIS: 38 DEGREES
T OFFSET: 439 MS
VENTRICULAR RATE: 50 BPM

## 2025-06-27 DIAGNOSIS — Z86.39 HISTORY OF DIABETES MELLITUS: ICD-10-CM

## 2025-06-27 DIAGNOSIS — K75.81 NASH (NONALCOHOLIC STEATOHEPATITIS): ICD-10-CM

## 2025-06-27 RX ORDER — PIOGLITAZONE 30 MG/1
30 TABLET ORAL DAILY
Qty: 90 TABLET | Refills: 1 | Status: SHIPPED | OUTPATIENT
Start: 2025-06-27

## 2025-07-01 DIAGNOSIS — E78.5 HYPERLIPIDEMIA, UNSPECIFIED HYPERLIPIDEMIA TYPE: ICD-10-CM

## 2025-07-01 RX ORDER — FENOFIBRATE 160 MG/1
160 TABLET ORAL DAILY
Qty: 90 TABLET | Refills: 1 | Status: SHIPPED | OUTPATIENT
Start: 2025-07-01

## 2025-07-01 NOTE — TELEPHONE ENCOUNTER
Patient needs refill on   Fenofibrte 160 mg   [facility]     Pharmacy had a mix up so patient is now out of medication

## 2025-07-02 DIAGNOSIS — E11.9 TYPE 2 DIABETES MELLITUS WITHOUT COMPLICATION, WITHOUT LONG-TERM CURRENT USE OF INSULIN: ICD-10-CM

## 2025-07-09 ENCOUNTER — TELEPHONE (OUTPATIENT)
Facility: CLINIC | Age: 75
End: 2025-07-09

## 2025-07-09 ENCOUNTER — APPOINTMENT (OUTPATIENT)
Facility: CLINIC | Age: 75
End: 2025-07-09
Payer: MEDICARE

## 2025-07-09 NOTE — TELEPHONE ENCOUNTER
Patient is having a Endoscopic US and they told her to check with you regarding holding any meds especially diabetic meds.

## 2025-07-31 ENCOUNTER — ANESTHESIA EVENT (OUTPATIENT)
Dept: GASTROENTEROLOGY | Facility: HOSPITAL | Age: 75
End: 2025-07-31
Payer: MEDICARE

## 2025-07-31 ENCOUNTER — HOSPITAL ENCOUNTER (OUTPATIENT)
Dept: GASTROENTEROLOGY | Facility: HOSPITAL | Age: 75
Discharge: HOME | End: 2025-07-31
Payer: MEDICARE

## 2025-07-31 ENCOUNTER — ANESTHESIA (OUTPATIENT)
Dept: GASTROENTEROLOGY | Facility: HOSPITAL | Age: 75
End: 2025-07-31
Payer: MEDICARE

## 2025-07-31 VITALS
RESPIRATION RATE: 18 BRPM | TEMPERATURE: 97.5 F | OXYGEN SATURATION: 98 % | BODY MASS INDEX: 25.65 KG/M2 | DIASTOLIC BLOOD PRESSURE: 54 MMHG | SYSTOLIC BLOOD PRESSURE: 141 MMHG | HEART RATE: 70 BPM | WEIGHT: 140.21 LBS

## 2025-07-31 DIAGNOSIS — K86.89 DILATION OF PANCREATIC DUCT (HHS-HCC): ICD-10-CM

## 2025-07-31 DIAGNOSIS — K26.9 DUODENAL ULCER: Primary | ICD-10-CM

## 2025-07-31 LAB
GLUCOSE BLD MANUAL STRIP-MCNC: 124 MG/DL (ref 74–99)
GLUCOSE BLD MANUAL STRIP-MCNC: 137 MG/DL (ref 74–99)

## 2025-07-31 PROCEDURE — 3700000001 HC GENERAL ANESTHESIA TIME - INITIAL BASE CHARGE

## 2025-07-31 PROCEDURE — 7100000010 HC PHASE TWO TIME - EACH INCREMENTAL 1 MINUTE

## 2025-07-31 PROCEDURE — 82947 ASSAY GLUCOSE BLOOD QUANT: CPT

## 2025-07-31 PROCEDURE — 3700000002 HC GENERAL ANESTHESIA TIME - EACH INCREMENTAL 1 MINUTE

## 2025-07-31 PROCEDURE — 2500000004 HC RX 250 GENERAL PHARMACY W/ HCPCS (ALT 636 FOR OP/ED): Performed by: ANESTHESIOLOGIST ASSISTANT

## 2025-07-31 PROCEDURE — 7100000009 HC PHASE TWO TIME - INITIAL BASE CHARGE

## 2025-07-31 PROCEDURE — 43239 EGD BIOPSY SINGLE/MULTIPLE: CPT | Performed by: INTERNAL MEDICINE

## 2025-07-31 PROCEDURE — 43237 ENDOSCOPIC US EXAM ESOPH: CPT | Performed by: INTERNAL MEDICINE

## 2025-07-31 RX ORDER — GLYCOPYRROLATE 0.2 MG/ML
INJECTION INTRAMUSCULAR; INTRAVENOUS AS NEEDED
Status: DISCONTINUED | OUTPATIENT
Start: 2025-07-31 | End: 2025-07-31

## 2025-07-31 RX ORDER — PANTOPRAZOLE SODIUM 40 MG/1
40 TABLET, DELAYED RELEASE ORAL
Qty: 90 TABLET | Refills: 3 | Status: SHIPPED | OUTPATIENT
Start: 2025-07-31 | End: 2026-07-31

## 2025-07-31 RX ORDER — PROPOFOL 10 MG/ML
INJECTION, EMULSION INTRAVENOUS AS NEEDED
Status: DISCONTINUED | OUTPATIENT
Start: 2025-07-31 | End: 2025-07-31

## 2025-07-31 RX ORDER — LIDOCAINE HYDROCHLORIDE 20 MG/ML
INJECTION, SOLUTION INFILTRATION; PERINEURAL AS NEEDED
Status: DISCONTINUED | OUTPATIENT
Start: 2025-07-31 | End: 2025-07-31

## 2025-07-31 RX ADMIN — GLYCOPYRROLATE 0.2 MG: 0.2 INJECTION INTRAMUSCULAR; INTRAVENOUS at 10:29

## 2025-07-31 RX ADMIN — LIDOCAINE HYDROCHLORIDE 100 MG: 20 INJECTION, SOLUTION INFILTRATION; PERINEURAL at 10:31

## 2025-07-31 RX ADMIN — PROPOFOL 30 MG: 10 INJECTION, EMULSION INTRAVENOUS at 10:40

## 2025-07-31 RX ADMIN — PROPOFOL 70 MG: 10 INJECTION, EMULSION INTRAVENOUS at 10:31

## 2025-07-31 RX ADMIN — PROPOFOL 150 MCG/KG/MIN: 10 INJECTION, EMULSION INTRAVENOUS at 10:32

## 2025-07-31 RX ADMIN — SODIUM CHLORIDE, SODIUM LACTATE, POTASSIUM CHLORIDE, AND CALCIUM CHLORIDE: .6; .31; .03; .02 INJECTION, SOLUTION INTRAVENOUS at 10:29

## 2025-07-31 SDOH — HEALTH STABILITY: MENTAL HEALTH: CURRENT SMOKER: 0

## 2025-07-31 ASSESSMENT — PAIN SCALES - GENERAL
PAINLEVEL_OUTOF10: 0 - NO PAIN
PAIN_LEVEL: 4

## 2025-07-31 ASSESSMENT — PAIN - FUNCTIONAL ASSESSMENT
PAIN_FUNCTIONAL_ASSESSMENT: VAS (VISUAL ANALOG SCALE)
PAIN_FUNCTIONAL_ASSESSMENT: 0-10

## 2025-07-31 ASSESSMENT — COLUMBIA-SUICIDE SEVERITY RATING SCALE - C-SSRS
2. HAVE YOU ACTUALLY HAD ANY THOUGHTS OF KILLING YOURSELF?: NO
6. HAVE YOU EVER DONE ANYTHING, STARTED TO DO ANYTHING, OR PREPARED TO DO ANYTHING TO END YOUR LIFE?: NO
1. IN THE PAST MONTH, HAVE YOU WISHED YOU WERE DEAD OR WISHED YOU COULD GO TO SLEEP AND NOT WAKE UP?: NO

## 2025-07-31 ASSESSMENT — ENCOUNTER SYMPTOMS: CONSTITUTIONAL NEGATIVE: 1

## 2025-07-31 NOTE — ANESTHESIA POSTPROCEDURE EVALUATION
Patient: Yuliet Granados    Procedure Summary       Date: 07/31/25 Room / Location: [facility]    Anesthesia Start: 1029 Anesthesia Stop: 1054    Procedure: ENDOSCOPIC ULTRASOUND (UPPER) Diagnosis: Dilation of pancreatic duct ([facility])    Scheduled Providers: Colin Castro DO; Jeffrey Mota MD; WILLIAM Guzman Responsible Provider: Jeffrey Mota MD    Anesthesia Type: MAC ASA Status: 2            Anesthesia Type: MAC    Vitals Value Taken Time   BP  07/31/25 11:10   Temp  07/31/25 11:10   Pulse  07/31/25 11:10   Resp  07/31/25 11:10   SpO2  07/31/25 11:10       Anesthesia Post Evaluation    Patient location during evaluation: PACU  Patient participation: complete - patient participated  Level of consciousness: awake and alert  Pain score: 4  Pain management: adequate  Airway patency: patent  Cardiovascular status: acceptable  Respiratory status: acceptable  Hydration status: acceptable  Postoperative Nausea and Vomiting: none        No notable events documented.

## 2025-07-31 NOTE — ANESTHESIA PREPROCEDURE EVALUATION
Patient: Yuliet Granados    Procedure Information       Date/Time: 07/31/25 0950    Scheduled providers: Colin Castro DO; Jeffrey Mota MD; WILLIAM Guzman    Procedure: ENDOSCOPIC ULTRASOUND (UPPER)    Location: [facility]            Relevant Problems   Cardiac   (+) Hyperlipidemia   (+) Hypertension      Neuro   (+) Lumbar radiculitis   (+) Lumbosacral radiculopathy due to degenerative joint disease of spine   (+) Sciatica      Liver   (+) HENLEY (nonalcoholic steatohepatitis)       Clinical information reviewed:   Tobacco  Allergies  Meds   Med Hx  Surg Hx  OB Status  Fam Hx  Soc   Hx        NPO Detail:  NPO/Void Status  Carbohydrate Drink Given Prior to Surgery? : N  Date of Last Liquid: 07/31/25  Time of Last Liquid: 0300  Date of Last Solid: 07/30/25  Time of Last Solid: 1800  Last Intake Type: Clear fluids  Time of Last Void: 0800         Physical Exam    Airway  Mallampati: I  TM distance: >3 FB  Neck ROM: full     Cardiovascular - normal exam   Dental - normal exam     Pulmonary - normal exam   Abdominal - normal exam           Anesthesia Plan    History of general anesthesia?: yes  History of complications of general anesthesia?: no    ASA 2     MAC     The patient is not a current smoker.  Patient was not previously instructed to abstain from smoking on day of procedure.  Patient did not smoke on day of procedure.    intravenous induction   Postoperative pain plan includes opioids.  Anesthetic plan and risks discussed with patient.  Use of blood products discussed with patient who.    Plan discussed with [facility]AA.

## 2025-07-31 NOTE — H&P
History Of Present Illness  Yuliet Granados is a 75 y.o. female presenting with an incidental finding of a dilated pancreatic duct with transition in the mid body. Initial imaging was of the liver with an US that was followed by MRI due to limited view on US suggesting an irregular contour of her liver.  She has difficult to control blood sugar but no bowel symptoms.  She is here with her two daughters.       Past Medical History  Medical History[1]  Surgical History  Surgical History[2]  Social History  She reports that she has been smoking cigarettes. She started smoking about 59 years ago. She has a 42 pack-year smoking history. She has never used smokeless tobacco. She reports that she does not currently use alcohol. She reports that she does not use drugs.    Family History  Family History[3]     Allergies  Allergies[4]  Review of Systems   Constitutional: Negative.         Physical Exam  Constitutional:       Appearance: Normal appearance.     Cardiovascular:      Rate and Rhythm: Normal rate and regular rhythm.   Pulmonary:      Effort: Pulmonary effort is normal.      Breath sounds: Normal breath sounds.   Abdominal:      Palpations: Abdomen is soft.     Neurological:      Mental Status: She is alert.     Psychiatric:         Mood and Affect: Mood normal.         Behavior: Behavior normal.         Thought Content: Thought content normal.         Judgment: Judgment normal.          Last Recorded Vitals  Blood pressure (!) 149/49, pulse 64, temperature 36 °C (96.8 °F), temperature source Temporal, resp. rate 16, weight 63.6 kg (140 lb 3.4 oz), SpO2 94%.    Assessment/Plan   EGD and EUS of the pancreas as planned     Colin Castro DO       [1]   Past Medical History:  Diagnosis Date    Allergic     Arthritis     Breast cyst     Colon polyp 2015    Diabetes mellitus (Multi)     GERD (gastroesophageal reflux disease)     Hypertension 1980s    Neuromuscular disorder (Multi)     Pancreatitis ([facility]) 2013     Personal history of other diseases of the circulatory system     History of hypertension    Personal history of other endocrine, nutritional and metabolic disease     History of diabetes mellitus    Personal history of other endocrine, nutritional and metabolic disease     History of hyperlipidemia    Personal history of other malignant neoplasm of skin     History of skin cancer    Thyroid nodule    [2]   Past Surgical History:  Procedure Laterality Date    APPENDECTOMY      BREAST BIOPSY Left     benign x 2    BREAST CYST ASPIRATION       SECTION, LOW TRANSVERSE  1971    CT ANGIO AORTA AND BILATERAL ILIOFEMORAL RUN OFF INCLUDING WITHOUT CONTRAST IF PERFORMED  2023    CT AORTA AND BILATERAL ILIOFEMORAL RUNOFF ANGIOGRAM W AND/OR WO IV CONTRAST 2023 [facility] CT    HYSTERECTOMY  10/09/1980    Hysterectomy    OTHER SURGICAL HISTORY  10/04/2019    Back surgery    OTHER SURGICAL HISTORY  10/04/2019    Neck surgery   [3]   Family History  Problem Relation Name Age of Onset    Heart disease Mother Tootie Patrick     Heart disease Father Arun Patrick     Hypertension Mother Tootie Patrick 50 - 59    Hypertension Father Arun Patrick     Stroke Father Arun Patrick     Heart attack Father Arun Patrick 50 - 59    Diabetes Brother Paolo Patrick 60 - 69   [4]   Allergies  Allergen Reactions    Amoxicillin Nausea And Vomiting    Amoxicillin-Pot Clavulanate Nausea And Vomiting    Doxycycline Nausea And Vomiting    Meperidine Hallucinations

## 2025-07-31 NOTE — DISCHARGE INSTRUCTIONS

## 2025-08-01 ASSESSMENT — PAIN SCALES - GENERAL: PAINLEVEL_OUTOF10: 0 - NO PAIN

## 2025-08-06 LAB
LABORATORY COMMENT REPORT: NORMAL
PATH REPORT.FINAL DX SPEC: NORMAL
PATH REPORT.GROSS SPEC: NORMAL
PATH REPORT.TOTAL CANCER: NORMAL

## 2025-08-12 ENCOUNTER — APPOINTMENT (OUTPATIENT)
Dept: OTOLARYNGOLOGY | Facility: CLINIC | Age: 75
End: 2025-08-12
Payer: MEDICARE

## 2025-08-12 VITALS — BODY MASS INDEX: 25.76 KG/M2 | WEIGHT: 140 LBS | HEIGHT: 62 IN | TEMPERATURE: 96.9 F

## 2025-08-12 DIAGNOSIS — E04.1 LEFT THYROID NODULE: Primary | ICD-10-CM

## 2025-08-12 DIAGNOSIS — E04.1 THYROID NODULE: ICD-10-CM

## 2025-08-12 DIAGNOSIS — E04.2 NONTOXIC MULTINODULAR GOITER: ICD-10-CM

## 2025-08-12 PROCEDURE — 99214 OFFICE O/P EST MOD 30 MIN: CPT | Performed by: OTOLARYNGOLOGY

## 2025-08-12 PROCEDURE — 1159F MED LIST DOCD IN RCRD: CPT | Performed by: OTOLARYNGOLOGY

## 2025-08-19 ENCOUNTER — OFFICE VISIT (OUTPATIENT)
Dept: GASTROENTEROLOGY | Facility: CLINIC | Age: 75
End: 2025-08-19
Payer: MEDICARE

## 2025-08-19 VITALS
BODY MASS INDEX: 26.66 KG/M2 | WEIGHT: 144.6 LBS | TEMPERATURE: 97.7 F | DIASTOLIC BLOOD PRESSURE: 63 MMHG | RESPIRATION RATE: 20 BRPM | HEART RATE: 63 BPM | SYSTOLIC BLOOD PRESSURE: 146 MMHG

## 2025-08-19 DIAGNOSIS — K86.1 CHRONIC PANCREATITIS, UNSPECIFIED PANCREATITIS TYPE (MULTI): Primary | ICD-10-CM

## 2025-08-19 PROCEDURE — 3077F SYST BP >= 140 MM HG: CPT | Performed by: INTERNAL MEDICINE

## 2025-08-19 PROCEDURE — 1159F MED LIST DOCD IN RCRD: CPT | Performed by: INTERNAL MEDICINE

## 2025-08-19 PROCEDURE — 99212 OFFICE O/P EST SF 10 MIN: CPT

## 2025-08-19 PROCEDURE — 99212 OFFICE O/P EST SF 10 MIN: CPT | Performed by: INTERNAL MEDICINE

## 2025-08-19 PROCEDURE — 1126F AMNT PAIN NOTED NONE PRSNT: CPT | Performed by: INTERNAL MEDICINE

## 2025-08-19 PROCEDURE — 3078F DIAST BP <80 MM HG: CPT | Performed by: INTERNAL MEDICINE

## 2025-08-19 ASSESSMENT — PAIN SCALES - GENERAL: PAINLEVEL_OUTOF10: 0-NO PAIN

## 2025-08-20 DIAGNOSIS — E78.5 HYPERLIPIDEMIA, UNSPECIFIED HYPERLIPIDEMIA TYPE: ICD-10-CM

## 2025-08-21 RX ORDER — PRAVASTATIN SODIUM 20 MG/1
20 TABLET ORAL DAILY
Qty: 30 TABLET | Refills: 0 | Status: SHIPPED | OUTPATIENT
Start: 2025-08-21

## 2025-08-28 LAB — ELASTASE PANC STL-MCNT: 398 MCG/G

## 2025-09-10 ENCOUNTER — APPOINTMENT (OUTPATIENT)
Dept: PRIMARY CARE | Facility: CLINIC | Age: 75
End: 2025-09-10
Payer: MEDICARE

## 2025-10-02 ENCOUNTER — APPOINTMENT (OUTPATIENT)
Facility: CLINIC | Age: 75
End: 2025-10-02
Payer: MEDICARE

## 2026-02-20 ENCOUNTER — APPOINTMENT (OUTPATIENT)
Dept: PRIMARY CARE | Facility: CLINIC | Age: 76
End: 2026-02-20
Payer: MEDICARE

## 2026-03-03 ENCOUNTER — APPOINTMENT (OUTPATIENT)
Dept: ENDOCRINOLOGY | Facility: CLINIC | Age: 76
End: 2026-03-03
Payer: MEDICARE